# Patient Record
Sex: FEMALE | Race: WHITE | Employment: OTHER | URBAN - NONMETROPOLITAN AREA
[De-identification: names, ages, dates, MRNs, and addresses within clinical notes are randomized per-mention and may not be internally consistent; named-entity substitution may affect disease eponyms.]

---

## 2017-09-27 ENCOUNTER — OFFICE VISIT (OUTPATIENT)
Dept: ENDOCRINOLOGY | Age: 62
End: 2017-09-27
Payer: MEDICARE

## 2017-09-27 VITALS
HEART RATE: 69 BPM | BODY MASS INDEX: 50.02 KG/M2 | DIASTOLIC BLOOD PRESSURE: 72 MMHG | HEIGHT: 64 IN | WEIGHT: 293 LBS | SYSTOLIC BLOOD PRESSURE: 136 MMHG | RESPIRATION RATE: 24 BRPM

## 2017-09-27 DIAGNOSIS — E11.9 TYPE 2 DIABETES MELLITUS WITHOUT COMPLICATION, WITHOUT LONG-TERM CURRENT USE OF INSULIN (HCC): ICD-10-CM

## 2017-09-27 DIAGNOSIS — E03.9 ACQUIRED HYPOTHYROIDISM: Primary | ICD-10-CM

## 2017-09-27 DIAGNOSIS — I10 ESSENTIAL HYPERTENSION: ICD-10-CM

## 2017-09-27 PROCEDURE — 3046F HEMOGLOBIN A1C LEVEL >9.0%: CPT | Performed by: INTERNAL MEDICINE

## 2017-09-27 PROCEDURE — 3017F COLORECTAL CA SCREEN DOC REV: CPT | Performed by: INTERNAL MEDICINE

## 2017-09-27 PROCEDURE — 99204 OFFICE O/P NEW MOD 45 MIN: CPT | Performed by: INTERNAL MEDICINE

## 2017-09-27 PROCEDURE — G8427 DOCREV CUR MEDS BY ELIG CLIN: HCPCS | Performed by: INTERNAL MEDICINE

## 2017-09-27 PROCEDURE — G8417 CALC BMI ABV UP PARAM F/U: HCPCS | Performed by: INTERNAL MEDICINE

## 2017-09-27 PROCEDURE — 1036F TOBACCO NON-USER: CPT | Performed by: INTERNAL MEDICINE

## 2017-09-27 PROCEDURE — 3014F SCREEN MAMMO DOC REV: CPT | Performed by: INTERNAL MEDICINE

## 2017-09-27 RX ORDER — ACETAMINOPHEN 325 MG/1
650 TABLET ORAL EVERY 8 HOURS PRN
Status: ON HOLD | COMMUNITY
End: 2019-06-12 | Stop reason: ALTCHOICE

## 2017-09-27 RX ORDER — CLOZAPINE 100 MG/1
100 TABLET ORAL NIGHTLY
Status: ON HOLD | COMMUNITY
End: 2018-04-17 | Stop reason: CLARIF

## 2017-09-27 RX ORDER — SALSALATE 750 MG
750 TABLET ORAL 2 TIMES DAILY
Status: ON HOLD | COMMUNITY
End: 2018-04-21 | Stop reason: HOSPADM

## 2017-09-27 RX ORDER — PANTOPRAZOLE SODIUM 40 MG/1
40 TABLET, DELAYED RELEASE ORAL DAILY
COMMUNITY

## 2017-09-27 RX ORDER — LEVOTHYROXINE SODIUM 0.05 MG/1
50 TABLET ORAL DAILY
COMMUNITY
End: 2017-12-07 | Stop reason: SDUPTHER

## 2017-09-29 LAB
AVERAGE GLUCOSE: NORMAL
CHOLESTEROL, TOTAL: 166 MG/DL
CHOLESTEROL/HDL RATIO: 4.9
CREATININE, URINE: 214
HBA1C MFR BLD: 6.3 %
HDLC SERPL-MCNC: 34 MG/DL (ref 35–70)
LDL CHOLESTEROL CALCULATED: 93 MG/DL (ref 0–160)
MICROALBUMIN/CREAT 24H UR: 17.5 MG/G{CREAT}
MICROALBUMIN/CREAT UR-RTO: 8
T4 FREE: 0.95
TRIGL SERPL-MCNC: 197 MG/DL
TSH SERPL DL<=0.05 MIU/L-ACNC: 2.3 UIU/ML
VLDLC SERPL CALC-MCNC: 39 MG/DL

## 2017-11-07 ENCOUNTER — HOSPITAL ENCOUNTER (OUTPATIENT)
Dept: PHARMACY | Age: 62
Setting detail: THERAPIES SERIES
Discharge: HOME | End: 2017-11-07
Payer: MEDICARE

## 2017-11-07 VITALS
HEIGHT: 64 IN | BODY MASS INDEX: 50.02 KG/M2 | WEIGHT: 293 LBS | SYSTOLIC BLOOD PRESSURE: 102 MMHG | DIASTOLIC BLOOD PRESSURE: 64 MMHG

## 2017-11-07 PROCEDURE — G0108 DIAB MANAGE TRN  PER INDIV: HCPCS | Performed by: REGISTERED NURSE

## 2017-11-07 RX ORDER — METRONIDAZOLE 10 MG/G
1 GEL TOPICAL PRN
Status: ON HOLD | COMMUNITY
Start: 2017-04-13 | End: 2018-04-21 | Stop reason: HOSPADM

## 2017-12-07 ENCOUNTER — OFFICE VISIT (OUTPATIENT)
Dept: ENDOCRINOLOGY | Age: 62
End: 2017-12-07
Payer: MEDICARE

## 2017-12-07 VITALS
WEIGHT: 293 LBS | SYSTOLIC BLOOD PRESSURE: 120 MMHG | DIASTOLIC BLOOD PRESSURE: 78 MMHG | HEART RATE: 52 BPM | HEIGHT: 64 IN | RESPIRATION RATE: 24 BRPM | BODY MASS INDEX: 50.02 KG/M2

## 2017-12-07 DIAGNOSIS — E11.9 TYPE 2 DIABETES MELLITUS WITHOUT COMPLICATION, WITHOUT LONG-TERM CURRENT USE OF INSULIN (HCC): ICD-10-CM

## 2017-12-07 DIAGNOSIS — I10 ESSENTIAL HYPERTENSION: ICD-10-CM

## 2017-12-07 DIAGNOSIS — E03.9 ACQUIRED HYPOTHYROIDISM: Primary | ICD-10-CM

## 2017-12-07 PROCEDURE — 1036F TOBACCO NON-USER: CPT | Performed by: INTERNAL MEDICINE

## 2017-12-07 PROCEDURE — 99214 OFFICE O/P EST MOD 30 MIN: CPT | Performed by: INTERNAL MEDICINE

## 2017-12-07 PROCEDURE — G8484 FLU IMMUNIZE NO ADMIN: HCPCS | Performed by: INTERNAL MEDICINE

## 2017-12-07 PROCEDURE — 3014F SCREEN MAMMO DOC REV: CPT | Performed by: INTERNAL MEDICINE

## 2017-12-07 PROCEDURE — G8417 CALC BMI ABV UP PARAM F/U: HCPCS | Performed by: INTERNAL MEDICINE

## 2017-12-07 PROCEDURE — 3017F COLORECTAL CA SCREEN DOC REV: CPT | Performed by: INTERNAL MEDICINE

## 2017-12-07 PROCEDURE — 3044F HG A1C LEVEL LT 7.0%: CPT | Performed by: INTERNAL MEDICINE

## 2017-12-07 PROCEDURE — G8427 DOCREV CUR MEDS BY ELIG CLIN: HCPCS | Performed by: INTERNAL MEDICINE

## 2017-12-07 RX ORDER — LEVOTHYROXINE SODIUM 0.05 MG/1
50 TABLET ORAL DAILY
Qty: 30 TABLET | Refills: 5 | Status: SHIPPED | OUTPATIENT
Start: 2017-12-07

## 2017-12-07 NOTE — PROGRESS NOTES
[facility] PROFESSIONAL SERVS  ENDOCRINE DIABETES METABOLISM [facility]  [address]  [address]  Dept: [phone]  Dept Fax: [phone] : [phone]    Visit Date: 12/7/2017    Murphy Couch is a 57 y.o. female who presents today for:  Chief Complaint   Patient presents with    Hypothyroidism    Diabetes     Type 2    Foot Problem     some tingling    Eye Problem     9/17    Hypertension    Results     9/29/17- labs completed            Subjective:   57-year-old female being followed for hypothyroidism, type 2 diabetes mellitus and hypertension. Patient has had hypothyroidism for 15 years. She takes 50 µg of Synthroid and her labs were normal in September. Today she is complaining of a little bit of fatigue and mild depression with no constipation or cold intolerance. Patient reports complete compliance with medication. The patient takes metformin 500 mg twice a day for diabetes mellitus. She tells me that she checks blood sugar one to 2 times a day but did not bring her records. The patient denies polyuria, polydipsia and visual blurriness. She reports no open sores in the feet. For blood pressure she takes 180 mg of verapamil daily. Patient denies chest pain and shortness of breath. There is no headache or visual problems. Past Medical History:   Diagnosis Date    Fibromyalgia     Hyperlipidemia     Hypertension     Hypothyroidism     Restless leg     Type 2 diabetes mellitus without complication ([address]       History reviewed. No pertinent surgical history.     Family History   Problem Relation Age of Onset    Diabetes Mother     Heart Disease Mother     Diabetes Father      Social History   Substance Use Topics    Smoking status: Never Smoker    Smokeless tobacco: Never Used    Alcohol use No      Current Outpatient Prescriptions   Medication Sig Dispense Refill    levothyroxine (SYNTHROID) 50 MCG tablet Take 1 tablet by mouth Daily 30 tablet 5    VLDL 39 09/29/2017     Lab Results   Component Value Date    RUSTY 4.9 09/29/2017         Review of Systems  Constitutional: negative for chills and fevers  Eyes: negative for irritation, redness and visual disturbance  Respiratory: negative for cough, shortness of breath and wheezing  Cardiovascular: negative for chest pain, irregular heart beat and palpitations    The remainder of systems were reviewed and negative. Objective:   /78   Pulse 52   Resp 24   Ht 5' 4.02\" (1.626 m)   Wt (!) 368 lb 8 oz (167.2 kg)   BMI 63.22 kg/m²     General:  alert, appears stated age, cooperative and no distress   Oropharynx: normal findings: lips normal without lesions, buccal mucosa normal, gums healthy and tongue midline and normal    Eyes:  negative findings: lids and lashes normal, conjunctivae and sclerae normal, corneas clear and pupils equal, round, reactive to light and accomodation       Neck: no adenopathy, no carotid bruit, no JVD, supple, symmetrical, trachea midline and thyroid not enlarged, symmetric, no tenderness/mass/nodules   Thyroid:  no palpable nodule   Lung: clear to auscultation bilaterally   Heart:  regular rate and rhythm, S1, S2 normal, no murmur, click, rub or gallop and normal apical impulse   Abdomen: soft, non-tender; bowel sounds normal; no masses,  no organomegaly   Extremities: extremities normal, atraumatic, no cyanosis or edema and Homans sign is negative, no sign of DVT   Pulses: 2+ and symmetric   Skin: warm and dry, no hyperpigmentation, vitiligo, or suspicious lesions   Neuro: normal without focal findings, mental status, speech normal, alert and oriented x3, KRIS, cranial nerves 2-12 intact, muscle tone and strength normal and symmetric. LNS: no cervical/supraclavicular or submental adenopathy  Psych: awake, alert and oriented, with good eye contact and normal behavior. Affect is normal with no anxious or depressed mood.  Insight is appropriate with no

## 2017-12-07 NOTE — LETTER
 Smoking status: Never Smoker    Smokeless tobacco: Never Used    Alcohol use No      Current Outpatient Prescriptions   Medication Sig Dispense Refill    levothyroxine (SYNTHROID) 50 MCG tablet Take 1 tablet by mouth Daily 30 tablet 5    metFORMIN (GLUCOPHAGE) 500 MG tablet Take 1 tablet by mouth 2 times daily (with meals) 60 tablet 5    metroNIDAZOLE (METROGEL) 1 % gel Apply 1 applicator topically as needed      acetaminophen (TYLENOL) 325 MG tablet Take 650 mg by mouth every 8 hours      verapamil (CALAN SR) 180 MG extended release tablet Take 180 mg by mouth nightly      cloZAPine (CLOZARIL) 100 MG tablet Take 100 mg by mouth nightly      pantoprazole (PROTONIX) 40 MG tablet Take 40 mg by mouth daily      aspirin 81 MG tablet Take 81 mg by mouth daily      fluticasone-salmeterol (ADVAIR) 250-50 MCG/DOSE AEPB Inhale 1 puff into the lungs every 12 hours      salsalate (DISALCID) 750 MG tablet Take 750 mg by mouth 2 times daily      sertraline (ZOLOFT) 50 MG tablet Take 50 mg by mouth daily       No current facility-administered medications for this visit.       Allergies   Allergen Reactions    Lisinopril Anaphylaxis     Health Maintenance   Topic Date Due    Hepatitis C screen  1955    Diabetic foot exam  05/11/1963    Diabetic retinal exam  05/11/1963    HIV screen  05/11/1968    DTaP/Tdap/Td vaccine (1 - Tdap) 05/11/1972    Pneumococcal med risk (1 of 1 - PPSV23) 05/11/1972    Cervical cancer screen  05/11/1974    Breast cancer screen  05/11/2003    Colon cancer screen colonoscopy  05/11/2003    Zostavax vaccine  05/11/2013    Flu vaccine (1) 09/01/2017    Diabetic hemoglobin A1C test  09/29/2018    Diabetic microalbuminuria test  09/29/2018    Lipid screen  09/29/2018       Labs  Lab Results   Component Value Date    LABA1C 6.3 09/29/2017     No results found for: EAG  Lab Results   Component Value Date    TSH 2.30 09/29/2017     Lab Results   Component Value Date CHOL 166 09/29/2017     Lab Results   Component Value Date    TRIG 197 09/29/2017     Lab Results   Component Value Date    HDL 34 (A) 09/29/2017     Lab Results   Component Value Date    LDLCALC 93 09/29/2017     Lab Results   Component Value Date    VLDL 39 09/29/2017     Lab Results   Component Value Date    CHOLHDLRATIO 4.9 09/29/2017         Review of Systems  Constitutional: negative for chills and fevers  Eyes: negative for irritation, redness and visual disturbance  Respiratory: negative for cough, shortness of breath and wheezing  Cardiovascular: negative for chest pain, irregular heart beat and palpitations    The remainder of systems were reviewed and negative.      Objective:   /78   Pulse 52   Resp 24   Ht 5' 4.02\" (1.626 m)   Wt (!) 368 lb 8 oz (167.2 kg)   BMI 63.22 kg/m²      General:  alert, appears stated age, cooperative and no distress   Oropharynx: normal findings: lips normal without lesions, buccal mucosa normal, gums healthy and tongue midline and normal    Eyes:  negative findings: lids and lashes normal, conjunctivae and sclerae normal, corneas clear and pupils equal, round, reactive to light and accomodation       Neck: no adenopathy, no carotid bruit, no JVD, supple, symmetrical, trachea midline and thyroid not enlarged, symmetric, no tenderness/mass/nodules   Thyroid:  no palpable nodule   Lung: clear to auscultation bilaterally   Heart:  regular rate and rhythm, S1, S2 normal, no murmur, click, rub or gallop and normal apical impulse   Abdomen: soft, non-tender; bowel sounds normal; no masses,  no organomegaly   Extremities: extremities normal, atraumatic, no cyanosis or edema and Homans sign is negative, no sign of DVT   Pulses: 2+ and symmetric   Skin: warm and dry, no hyperpigmentation, vitiligo, or suspicious lesions   Neuro: normal without focal findings, mental status, speech normal, alert and oriented x3, KRIS, cranial nerves 2-12 intact, muscle tone and strength normal and symmetric. LNS: no cervical/supraclavicular or submental adenopathy  Psych: awake, alert and oriented, with good eye contact and normal behavior. Affect is normal with no anxious or depressed mood. Insight is appropriate with no hallucinations/delusions or SI  M/S: no muscular hypertrophy or tenderness. There is no joint swelling/tenderness/redness  Assessment/Plan:     1. Acquired hypothyroidism: on treatment with reasonable control. To continue. \    2. Type 2 diabetes mellitus without complication, without long-term current use of insulin ([address]): Clinically doing well. I did not review her blood sugar readings that her A1c is at goal.  Committee challenges weight to be weight control. Therefore we discussed diet and exercise towards this goal.     3. Essential hypertension: on treatment with reasonable control. To continue. 4. Class 3 obesity due to excess calories with serious comorbidity and body mass index (BMI) of 60.0 to 69.9 in [facility]) : Diet and exercise were discussed. Orders Placed This Encounter   Procedures    TSH without Reflex     Standing Status:   Future     Standing Expiration Date:   12/7/2018    T4, Free     Standing Status:   Future     Standing Expiration Date:   12/7/2018       · The risks and benefits of my recommendations, as well as other treatment options were discussed with the patient today. Questions were answered. · Follow up: 3 months and as needed. If you have questions, please do not hesitate to call me. I look forward to following Victor Manuel along with you.     Sincerely,        Re Corbin MD

## 2018-04-16 ENCOUNTER — HOSPITAL ENCOUNTER (INPATIENT)
Age: 63
LOS: 5 days | Discharge: HOME HEALTH | DRG: 193 | End: 2018-04-21
Attending: INTERNAL MEDICINE | Admitting: INTERNAL MEDICINE
Payer: MEDICARE

## 2018-04-16 DIAGNOSIS — E66.2 OBESITY HYPOVENTILATION SYNDROME (HCC): ICD-10-CM

## 2018-04-16 DIAGNOSIS — G47.33 OBSTRUCTIVE SLEEP APNEA SYNDROME: ICD-10-CM

## 2018-04-16 DIAGNOSIS — J96.02 ACUTE RESPIRATORY FAILURE WITH HYPOXIA AND HYPERCAPNIA (HCC): ICD-10-CM

## 2018-04-16 DIAGNOSIS — J96.11 CHRONIC RESPIRATORY FAILURE WITH HYPOXIA AND HYPERCAPNIA (HCC): ICD-10-CM

## 2018-04-16 DIAGNOSIS — J96.12 CHRONIC RESPIRATORY FAILURE WITH HYPOXIA AND HYPERCAPNIA (HCC): ICD-10-CM

## 2018-04-16 DIAGNOSIS — J96.01 ACUTE RESPIRATORY FAILURE WITH HYPOXIA AND HYPERCAPNIA (HCC): ICD-10-CM

## 2018-04-16 DIAGNOSIS — R00.1 BRADYCARDIA: Primary | ICD-10-CM

## 2018-04-16 LAB — MRSA SCREEN RT-PCR: NEGATIVE

## 2018-04-16 PROCEDURE — 87641 MR-STAPH DNA AMP PROBE: CPT

## 2018-04-16 PROCEDURE — 87081 CULTURE SCREEN ONLY: CPT

## 2018-04-16 PROCEDURE — 2060000000 HC ICU INTERMEDIATE R&B

## 2018-04-17 ENCOUNTER — APPOINTMENT (OUTPATIENT)
Dept: GENERAL RADIOLOGY | Age: 63
DRG: 193 | End: 2018-04-17
Attending: INTERNAL MEDICINE
Payer: MEDICARE

## 2018-04-17 PROBLEM — G47.30 SLEEP APNEA: Status: ACTIVE | Noted: 2018-04-17

## 2018-04-17 PROBLEM — K21.9 GERD (GASTROESOPHAGEAL REFLUX DISEASE): Status: ACTIVE | Noted: 2018-04-17

## 2018-04-17 PROBLEM — R00.1 BRADYCARDIA: Status: ACTIVE | Noted: 2018-04-17

## 2018-04-17 PROBLEM — J45.909 ASTHMA: Status: ACTIVE | Noted: 2018-04-17

## 2018-04-17 PROBLEM — J18.9 PNEUMONIA DUE TO INFECTIOUS ORGANISM: Status: ACTIVE | Noted: 2018-04-17

## 2018-04-17 PROBLEM — E66.01 MORBID OBESITY (HCC): Status: ACTIVE | Noted: 2018-04-17

## 2018-04-17 PROBLEM — N18.30 STAGE 3 CHRONIC KIDNEY DISEASE (HCC): Status: ACTIVE | Noted: 2018-04-17

## 2018-04-17 PROBLEM — J96.01 ACUTE RESPIRATORY FAILURE WITH HYPOXIA AND HYPERCAPNIA (HCC): Status: ACTIVE | Noted: 2018-04-16

## 2018-04-17 LAB
ALLEN TEST: POSITIVE
ANION GAP SERPL CALCULATED.3IONS-SCNC: 9 MEQ/L (ref 8–16)
ANISOCYTOSIS: ABNORMAL
AVERAGE GLUCOSE: 135 MG/DL (ref 70–126)
BASE EXCESS (CALCULATED): 4.2 MMOL/L (ref -2.5–2.5)
BASE EXCESS (CALCULATED): 4.5 MMOL/L (ref -2.5–2.5)
BASE EXCESS (CALCULATED): 4.9 MMOL/L (ref -2.5–2.5)
BASE EXCESS (CALCULATED): 6.2 MMOL/L (ref -2.5–2.5)
BASOPHILS # BLD: 0.4 %
BASOPHILS ABSOLUTE: 0.1 THOU/MM3 (ref 0–0.1)
BUN BLDV-MCNC: 41 MG/DL (ref 7–22)
CALCIUM SERPL-MCNC: 9.6 MG/DL (ref 8.5–10.5)
CHLORIDE BLD-SCNC: 103 MEQ/L (ref 98–111)
CO2: 30 MEQ/L (ref 23–33)
COLLECTED BY:: ABNORMAL
CREAT SERPL-MCNC: 0.9 MG/DL (ref 0.4–1.2)
D-DIMER QUANTITATIVE: 967 NG/ML FEU (ref 0–500)
DEVICE: ABNORMAL
EKG ATRIAL RATE: 48 BPM
EKG P AXIS: 8 DEGREES
EKG P-R INTERVAL: 178 MS
EKG Q-T INTERVAL: 490 MS
EKG QRS DURATION: 96 MS
EKG QTC CALCULATION (BAZETT): 437 MS
EKG R AXIS: -7 DEGREES
EKG T AXIS: 50 DEGREES
EKG VENTRICULAR RATE: 48 BPM
EOSINOPHIL # BLD: 0.6 %
EOSINOPHILS ABSOLUTE: 0.1 THOU/MM3 (ref 0–0.4)
GFR SERPL CREATININE-BSD FRML MDRD: 63 ML/MIN/1.73M2
GLUCOSE BLD-MCNC: 175 MG/DL (ref 70–108)
GLUCOSE BLD-MCNC: 178 MG/DL (ref 70–108)
GLUCOSE BLD-MCNC: 185 MG/DL (ref 70–108)
GLUCOSE BLD-MCNC: 192 MG/DL (ref 70–108)
GLUCOSE BLD-MCNC: 219 MG/DL (ref 70–108)
GLUCOSE BLD-MCNC: 255 MG/DL (ref 70–108)
HBA1C MFR BLD: 6.5 % (ref 4.4–6.4)
HCO3: 32 MMOL/L (ref 23–28)
HCO3: 34 MMOL/L (ref 23–28)
HCO3: 35 MMOL/L (ref 23–28)
HCO3: 36 MMOL/L (ref 23–28)
HCT VFR BLD CALC: 40 % (ref 37–47)
HEMOGLOBIN: 13.1 GM/DL (ref 12–16)
IFIO2: 2
IFIO2: 40
INR BLD: 1.11 (ref 0.85–1.13)
LACTIC ACID: 1.8 MMOL/L (ref 0.5–2.2)
LIPASE: 10.4 U/L (ref 5.6–51.3)
LV EF: 53 %
LVEF MODALITY: NORMAL
LYMPHOCYTES # BLD: 7.5 %
LYMPHOCYTES ABSOLUTE: 1 THOU/MM3 (ref 1–4.8)
MCH RBC QN AUTO: 31.1 PG (ref 27–31)
MCHC RBC AUTO-ENTMCNC: 32.7 GM/DL (ref 33–37)
MCV RBC AUTO: 95 FL (ref 81–99)
MODE: ABNORMAL
MODE: ABNORMAL
MONOCYTES # BLD: 5.8 %
MONOCYTES ABSOLUTE: 0.8 THOU/MM3 (ref 0.4–1.3)
NUCLEATED RED BLOOD CELLS: 0 /100 WBC
O2 SATURATION: 87 %
O2 SATURATION: 95 %
O2 SATURATION: 95 %
O2 SATURATION: 96 %
OVALOCYTES: ABNORMAL
PCO2: 61 MMHG (ref 35–45)
PCO2: 72 MMHG (ref 35–45)
PCO2: 72 MMHG (ref 35–45)
PCO2: 87 MMHG (ref 35–45)
PDW BLD-RTO: 15.2 % (ref 11.5–14.5)
PH BLOOD GAS: 7.22 (ref 7.35–7.45)
PH BLOOD GAS: 7.28 (ref 7.35–7.45)
PH BLOOD GAS: 7.3 (ref 7.35–7.45)
PH BLOOD GAS: 7.33 (ref 7.35–7.45)
PLATELET # BLD: 206 THOU/MM3 (ref 130–400)
PLATELET ESTIMATE: ADEQUATE
PMV BLD AUTO: 9.6 FL (ref 7.4–10.4)
PO2: 58 MMHG (ref 71–104)
PO2: 91 MMHG (ref 71–104)
PO2: 93 MMHG (ref 71–104)
PO2: 94 MMHG (ref 71–104)
POIKILOCYTES: ABNORMAL
POTASSIUM REFLEX MAGNESIUM: 5.1 MEQ/L (ref 3.5–5.2)
PRO-BNP: 2029 PG/ML (ref 0–900)
PROCALCITONIN: 0.12 NG/ML (ref 0.01–0.09)
RBC # BLD: 4.21 MILL/MM3 (ref 4.2–5.4)
SCAN OF BLOOD SMEAR: NORMAL
SEG NEUTROPHILS: 85.7 %
SEGMENTED NEUTROPHILS ABSOLUTE COUNT: 11.7 THOU/MM3 (ref 1.8–7.7)
SET PEEP: 8 MMHG
SET PRESS SUPP: 12 CMH2O
SET PRESS SUPP: 16 CMH2O
SET PRESS SUPP: 8 CMH2O
SET RESPIRATORY RATE: 16 BPM
SET RESPIRATORY RATE: 20 BPM
SODIUM BLD-SCNC: 142 MEQ/L (ref 135–145)
SOURCE, BLOOD GAS: ABNORMAL
T4 FREE: 0.88 NG/DL (ref 0.93–1.76)
TROPONIN T: < 0.01 NG/ML
TSH SERPL DL<=0.05 MIU/L-ACNC: 0.29 UIU/ML (ref 0.4–4.2)
WBC # BLD: 13.7 THOU/MM3 (ref 4.8–10.8)

## 2018-04-17 PROCEDURE — 94660 CPAP INITIATION&MGMT: CPT

## 2018-04-17 PROCEDURE — 93306 TTE W/DOPPLER COMPLETE: CPT

## 2018-04-17 PROCEDURE — 2500000003 HC RX 250 WO HCPCS: Performed by: INTERNAL MEDICINE

## 2018-04-17 PROCEDURE — 84443 ASSAY THYROID STIM HORMONE: CPT

## 2018-04-17 PROCEDURE — APPSS180 APP SPLIT SHARED TIME > 60 MINUTES: Performed by: NURSE PRACTITIONER

## 2018-04-17 PROCEDURE — 85610 PROTHROMBIN TIME: CPT

## 2018-04-17 PROCEDURE — 6370000000 HC RX 637 (ALT 250 FOR IP): Performed by: FAMILY MEDICINE

## 2018-04-17 PROCEDURE — 84484 ASSAY OF TROPONIN QUANT: CPT

## 2018-04-17 PROCEDURE — 6370000000 HC RX 637 (ALT 250 FOR IP): Performed by: INTERNAL MEDICINE

## 2018-04-17 PROCEDURE — 71045 X-RAY EXAM CHEST 1 VIEW: CPT

## 2018-04-17 PROCEDURE — 83880 ASSAY OF NATRIURETIC PEPTIDE: CPT

## 2018-04-17 PROCEDURE — 99223 1ST HOSP IP/OBS HIGH 75: CPT | Performed by: FAMILY MEDICINE

## 2018-04-17 PROCEDURE — 82803 BLOOD GASES ANY COMBINATION: CPT

## 2018-04-17 PROCEDURE — 83036 HEMOGLOBIN GLYCOSYLATED A1C: CPT

## 2018-04-17 PROCEDURE — 83605 ASSAY OF LACTIC ACID: CPT

## 2018-04-17 PROCEDURE — 2060000000 HC ICU INTERMEDIATE R&B

## 2018-04-17 PROCEDURE — 94640 AIRWAY INHALATION TREATMENT: CPT

## 2018-04-17 PROCEDURE — 99233 SBSQ HOSP IP/OBS HIGH 50: CPT | Performed by: INTERNAL MEDICINE

## 2018-04-17 PROCEDURE — 2580000003 HC RX 258: Performed by: INTERNAL MEDICINE

## 2018-04-17 PROCEDURE — 82948 REAGENT STRIP/BLOOD GLUCOSE: CPT

## 2018-04-17 PROCEDURE — 2700000000 HC OXYGEN THERAPY PER DAY

## 2018-04-17 PROCEDURE — 80048 BASIC METABOLIC PNL TOTAL CA: CPT

## 2018-04-17 PROCEDURE — 99223 1ST HOSP IP/OBS HIGH 75: CPT | Performed by: INTERNAL MEDICINE

## 2018-04-17 PROCEDURE — 2580000003 HC RX 258: Performed by: FAMILY MEDICINE

## 2018-04-17 PROCEDURE — 84439 ASSAY OF FREE THYROXINE: CPT

## 2018-04-17 PROCEDURE — 6360000002 HC RX W HCPCS: Performed by: FAMILY MEDICINE

## 2018-04-17 PROCEDURE — 85379 FIBRIN DEGRADATION QUANT: CPT

## 2018-04-17 PROCEDURE — 84145 PROCALCITONIN (PCT): CPT

## 2018-04-17 PROCEDURE — 85025 COMPLETE CBC W/AUTO DIFF WBC: CPT

## 2018-04-17 PROCEDURE — 36600 WITHDRAWAL OF ARTERIAL BLOOD: CPT

## 2018-04-17 PROCEDURE — 83690 ASSAY OF LIPASE: CPT

## 2018-04-17 PROCEDURE — 6360000002 HC RX W HCPCS: Performed by: INTERNAL MEDICINE

## 2018-04-17 PROCEDURE — 36415 COLL VENOUS BLD VENIPUNCTURE: CPT

## 2018-04-17 PROCEDURE — 93005 ELECTROCARDIOGRAM TRACING: CPT | Performed by: FAMILY MEDICINE

## 2018-04-17 RX ORDER — DEXTROSE MONOHYDRATE 50 MG/ML
100 INJECTION, SOLUTION INTRAVENOUS PRN
Status: DISCONTINUED | OUTPATIENT
Start: 2018-04-16 | End: 2018-04-21 | Stop reason: HOSPADM

## 2018-04-17 RX ORDER — NICOTINE POLACRILEX 4 MG
15 LOZENGE BUCCAL PRN
Status: DISCONTINUED | OUTPATIENT
Start: 2018-04-16 | End: 2018-04-21 | Stop reason: HOSPADM

## 2018-04-17 RX ORDER — ASPIRIN 81 MG/1
81 TABLET, CHEWABLE ORAL DAILY
Status: DISCONTINUED | OUTPATIENT
Start: 2018-04-17 | End: 2018-04-21 | Stop reason: HOSPADM

## 2018-04-17 RX ORDER — CLONIDINE HYDROCHLORIDE 0.1 MG/1
0.1 TABLET ORAL 2 TIMES DAILY
Status: DISCONTINUED | OUTPATIENT
Start: 2018-04-17 | End: 2018-04-19

## 2018-04-17 RX ORDER — LEVOTHYROXINE SODIUM 0.05 MG/1
50 TABLET ORAL DAILY
Status: DISCONTINUED | OUTPATIENT
Start: 2018-04-17 | End: 2018-04-21 | Stop reason: HOSPADM

## 2018-04-17 RX ORDER — CLONIDINE HYDROCHLORIDE 0.1 MG/1
0.1 TABLET ORAL 2 TIMES DAILY
Status: ON HOLD | COMMUNITY
End: 2019-06-18 | Stop reason: HOSPADM

## 2018-04-17 RX ORDER — CLOZAPINE 100 MG/1
100 TABLET ORAL NIGHTLY
Status: DISCONTINUED | OUTPATIENT
Start: 2018-04-17 | End: 2018-04-17 | Stop reason: CLARIF

## 2018-04-17 RX ORDER — ACETAMINOPHEN 325 MG/1
650 TABLET ORAL EVERY 4 HOURS PRN
Status: DISCONTINUED | OUTPATIENT
Start: 2018-04-16 | End: 2018-04-21 | Stop reason: HOSPADM

## 2018-04-17 RX ORDER — IPRATROPIUM BROMIDE AND ALBUTEROL SULFATE 2.5; .5 MG/3ML; MG/3ML
1 SOLUTION RESPIRATORY (INHALATION) EVERY 4 HOURS
Status: DISCONTINUED | OUTPATIENT
Start: 2018-04-17 | End: 2018-04-21 | Stop reason: HOSPADM

## 2018-04-17 RX ORDER — IPRATROPIUM BROMIDE AND ALBUTEROL SULFATE 2.5; .5 MG/3ML; MG/3ML
1 SOLUTION RESPIRATORY (INHALATION) EVERY 4 HOURS PRN
Status: DISCONTINUED | OUTPATIENT
Start: 2018-04-16 | End: 2018-04-17

## 2018-04-17 RX ORDER — SODIUM CHLORIDE 0.9 % (FLUSH) 0.9 %
10 SYRINGE (ML) INJECTION PRN
Status: DISCONTINUED | OUTPATIENT
Start: 2018-04-16 | End: 2018-04-21 | Stop reason: HOSPADM

## 2018-04-17 RX ORDER — HEPARIN SODIUM 5000 [USP'U]/ML
5000 INJECTION, SOLUTION INTRAVENOUS; SUBCUTANEOUS EVERY 8 HOURS SCHEDULED
Status: DISCONTINUED | OUTPATIENT
Start: 2018-04-17 | End: 2018-04-17

## 2018-04-17 RX ORDER — PANTOPRAZOLE SODIUM 40 MG/1
40 TABLET, DELAYED RELEASE ORAL DAILY
Status: DISCONTINUED | OUTPATIENT
Start: 2018-04-17 | End: 2018-04-21 | Stop reason: HOSPADM

## 2018-04-17 RX ORDER — LACTOBACILLUS RHAMNOSUS GG 10B CELL
1 CAPSULE ORAL
Status: DISCONTINUED | OUTPATIENT
Start: 2018-04-18 | End: 2018-04-21 | Stop reason: HOSPADM

## 2018-04-17 RX ORDER — SODIUM CHLORIDE 0.9 % (FLUSH) 0.9 %
10 SYRINGE (ML) INJECTION EVERY 12 HOURS SCHEDULED
Status: DISCONTINUED | OUTPATIENT
Start: 2018-04-17 | End: 2018-04-21 | Stop reason: HOSPADM

## 2018-04-17 RX ORDER — ONDANSETRON 2 MG/ML
4 INJECTION INTRAMUSCULAR; INTRAVENOUS EVERY 6 HOURS PRN
Status: DISCONTINUED | OUTPATIENT
Start: 2018-04-16 | End: 2018-04-21 | Stop reason: HOSPADM

## 2018-04-17 RX ORDER — SODIUM CHLORIDE 9 MG/ML
INJECTION, SOLUTION INTRAVENOUS CONTINUOUS
Status: DISCONTINUED | OUTPATIENT
Start: 2018-04-17 | End: 2018-04-18

## 2018-04-17 RX ORDER — DOXYCYCLINE HYCLATE 100 MG
100 TABLET ORAL EVERY 12 HOURS SCHEDULED
Status: CANCELLED | OUTPATIENT
Start: 2018-04-17

## 2018-04-17 RX ORDER — DEXTROSE MONOHYDRATE 25 G/50ML
12.5 INJECTION, SOLUTION INTRAVENOUS PRN
Status: DISCONTINUED | OUTPATIENT
Start: 2018-04-16 | End: 2018-04-21 | Stop reason: HOSPADM

## 2018-04-17 RX ORDER — AZITHROMYCIN 250 MG/1
500 TABLET, FILM COATED ORAL DAILY
Status: DISCONTINUED | OUTPATIENT
Start: 2018-04-17 | End: 2018-04-17 | Stop reason: SDUPTHER

## 2018-04-17 RX ORDER — METHYLPREDNISOLONE SODIUM SUCCINATE 40 MG/ML
40 INJECTION, POWDER, LYOPHILIZED, FOR SOLUTION INTRAMUSCULAR; INTRAVENOUS DAILY
Status: DISCONTINUED | OUTPATIENT
Start: 2018-04-17 | End: 2018-04-19

## 2018-04-17 RX ADMIN — Medication 10 ML: at 20:27

## 2018-04-17 RX ADMIN — ENOXAPARIN SODIUM 60 MG: 60 INJECTION SUBCUTANEOUS at 16:10

## 2018-04-17 RX ADMIN — Medication 10 ML: at 09:28

## 2018-04-17 RX ADMIN — HEPARIN SODIUM 5000 UNITS: 5000 INJECTION, SOLUTION INTRAVENOUS; SUBCUTANEOUS at 06:14

## 2018-04-17 RX ADMIN — IPRATROPIUM BROMIDE AND ALBUTEROL SULFATE 1 AMPULE: .5; 3 SOLUTION RESPIRATORY (INHALATION) at 15:45

## 2018-04-17 RX ADMIN — SODIUM CHLORIDE: 9 INJECTION, SOLUTION INTRAVENOUS at 12:36

## 2018-04-17 RX ADMIN — LEVOTHYROXINE SODIUM 50 MCG: 50 TABLET ORAL at 06:14

## 2018-04-17 RX ADMIN — PIPERACILLIN SODIUM AND TAZOBACTAM SODIUM 3.38 G: 3; .375 INJECTION, POWDER, LYOPHILIZED, FOR SOLUTION INTRAVENOUS at 17:24

## 2018-04-17 RX ADMIN — INSULIN LISPRO 6 UNITS: 100 INJECTION, SOLUTION INTRAVENOUS; SUBCUTANEOUS at 17:24

## 2018-04-17 RX ADMIN — INSULIN LISPRO 2 UNITS: 100 INJECTION, SOLUTION INTRAVENOUS; SUBCUTANEOUS at 09:34

## 2018-04-17 RX ADMIN — DOXYCYCLINE 100 MG: 100 INJECTION, POWDER, LYOPHILIZED, FOR SOLUTION INTRAVENOUS at 16:10

## 2018-04-17 RX ADMIN — PIPERACILLIN SODIUM AND TAZOBACTAM SODIUM 3.38 G: 3; .375 INJECTION, POWDER, LYOPHILIZED, FOR SOLUTION INTRAVENOUS at 09:34

## 2018-04-17 RX ADMIN — Medication 1 UNITS: at 20:32

## 2018-04-17 RX ADMIN — METHYLPREDNISOLONE SODIUM SUCCINATE 40 MG: 40 INJECTION, POWDER, FOR SOLUTION INTRAMUSCULAR; INTRAVENOUS at 09:27

## 2018-04-17 RX ADMIN — PIPERACILLIN SODIUM AND TAZOBACTAM SODIUM 3.38 G: 3; .375 INJECTION, POWDER, LYOPHILIZED, FOR SOLUTION INTRAVENOUS at 01:46

## 2018-04-17 RX ADMIN — IPRATROPIUM BROMIDE AND ALBUTEROL SULFATE 1 AMPULE: .5; 3 SOLUTION RESPIRATORY (INHALATION) at 09:43

## 2018-04-17 RX ADMIN — IPRATROPIUM BROMIDE AND ALBUTEROL SULFATE 1 AMPULE: .5; 3 SOLUTION RESPIRATORY (INHALATION) at 19:30

## 2018-04-17 RX ADMIN — INSULIN LISPRO 2 UNITS: 100 INJECTION, SOLUTION INTRAVENOUS; SUBCUTANEOUS at 14:01

## 2018-04-17 RX ADMIN — Medication 2 UNITS: at 01:46

## 2018-04-17 RX ADMIN — PANTOPRAZOLE SODIUM 40 MG: 40 TABLET, DELAYED RELEASE ORAL at 06:14

## 2018-04-17 RX ADMIN — IPRATROPIUM BROMIDE AND ALBUTEROL SULFATE 1 AMPULE: .5; 3 SOLUTION RESPIRATORY (INHALATION) at 00:33

## 2018-04-17 RX ADMIN — CLONIDINE HYDROCHLORIDE 0.1 MG: 0.1 TABLET ORAL at 20:26

## 2018-04-17 ASSESSMENT — PAIN SCALES - GENERAL
PAINLEVEL_OUTOF10: 0

## 2018-04-18 ENCOUNTER — APPOINTMENT (OUTPATIENT)
Dept: CT IMAGING | Age: 63
DRG: 193 | End: 2018-04-18
Attending: INTERNAL MEDICINE
Payer: MEDICARE

## 2018-04-18 LAB
ALLEN TEST: POSITIVE
BASE EXCESS (CALCULATED): 5.7 MMOL/L (ref -2.5–2.5)
COLLECTED BY:: ABNORMAL
DEVICE: ABNORMAL
GLUCOSE BLD-MCNC: 126 MG/DL (ref 70–108)
GLUCOSE BLD-MCNC: 196 MG/DL (ref 70–108)
GLUCOSE BLD-MCNC: 197 MG/DL (ref 70–108)
GLUCOSE BLD-MCNC: 200 MG/DL (ref 70–108)
HCO3: 34 MMOL/L (ref 23–28)
IFIO2: 30
INR BLD: 1.11 (ref 0.85–1.13)
MODE: ABNORMAL
MRSA SCREEN: NORMAL
O2 SATURATION: 94 %
PCO2: 69 MMHG (ref 35–45)
PH BLOOD GAS: 7.31 (ref 7.35–7.45)
PO2: 82 MMHG (ref 71–104)
SET PEEP: 8 MMHG
SET PRESS SUPP: 16 CMH2O
SET RESPIRATORY RATE: 20 BPM
SOURCE, BLOOD GAS: ABNORMAL
VRE CULTURE: NORMAL

## 2018-04-18 PROCEDURE — 71250 CT THORAX DX C-: CPT

## 2018-04-18 PROCEDURE — 82803 BLOOD GASES ANY COMBINATION: CPT

## 2018-04-18 PROCEDURE — 99232 SBSQ HOSP IP/OBS MODERATE 35: CPT | Performed by: INTERNAL MEDICINE

## 2018-04-18 PROCEDURE — 2060000000 HC ICU INTERMEDIATE R&B

## 2018-04-18 PROCEDURE — 2580000003 HC RX 258: Performed by: FAMILY MEDICINE

## 2018-04-18 PROCEDURE — 99232 SBSQ HOSP IP/OBS MODERATE 35: CPT | Performed by: NURSE PRACTITIONER

## 2018-04-18 PROCEDURE — 85610 PROTHROMBIN TIME: CPT

## 2018-04-18 PROCEDURE — 2700000000 HC OXYGEN THERAPY PER DAY

## 2018-04-18 PROCEDURE — 6370000000 HC RX 637 (ALT 250 FOR IP): Performed by: FAMILY MEDICINE

## 2018-04-18 PROCEDURE — 36600 WITHDRAWAL OF ARTERIAL BLOOD: CPT

## 2018-04-18 PROCEDURE — 94640 AIRWAY INHALATION TREATMENT: CPT

## 2018-04-18 PROCEDURE — 2580000003 HC RX 258: Performed by: INTERNAL MEDICINE

## 2018-04-18 PROCEDURE — 6370000000 HC RX 637 (ALT 250 FOR IP): Performed by: INTERNAL MEDICINE

## 2018-04-18 PROCEDURE — 94660 CPAP INITIATION&MGMT: CPT

## 2018-04-18 PROCEDURE — 2500000003 HC RX 250 WO HCPCS: Performed by: INTERNAL MEDICINE

## 2018-04-18 PROCEDURE — 36415 COLL VENOUS BLD VENIPUNCTURE: CPT

## 2018-04-18 PROCEDURE — 6370000000 HC RX 637 (ALT 250 FOR IP): Performed by: NURSE PRACTITIONER

## 2018-04-18 PROCEDURE — 82948 REAGENT STRIP/BLOOD GLUCOSE: CPT

## 2018-04-18 PROCEDURE — 6360000002 HC RX W HCPCS: Performed by: INTERNAL MEDICINE

## 2018-04-18 PROCEDURE — 6360000002 HC RX W HCPCS: Performed by: FAMILY MEDICINE

## 2018-04-18 RX ORDER — ACYCLOVIR 50 MG/G
OINTMENT TOPICAL
Status: DISCONTINUED | OUTPATIENT
Start: 2018-04-18 | End: 2018-04-18 | Stop reason: CLARIF

## 2018-04-18 RX ORDER — AMLODIPINE BESYLATE 10 MG/1
10 TABLET ORAL DAILY
Status: DISCONTINUED | OUTPATIENT
Start: 2018-04-18 | End: 2018-04-21 | Stop reason: HOSPADM

## 2018-04-18 RX ORDER — HYDROCHLOROTHIAZIDE 25 MG/1
25 TABLET ORAL DAILY
Status: DISCONTINUED | OUTPATIENT
Start: 2018-04-18 | End: 2018-04-19

## 2018-04-18 RX ORDER — ACYCLOVIR 50 MG/G
OINTMENT TOPICAL 3 TIMES DAILY
Status: DISCONTINUED | OUTPATIENT
Start: 2018-04-18 | End: 2018-04-21 | Stop reason: HOSPADM

## 2018-04-18 RX ADMIN — Medication 10 ML: at 09:21

## 2018-04-18 RX ADMIN — PANTOPRAZOLE SODIUM 40 MG: 40 TABLET, DELAYED RELEASE ORAL at 06:27

## 2018-04-18 RX ADMIN — ASPIRIN 81 MG 81 MG: 81 TABLET ORAL at 09:24

## 2018-04-18 RX ADMIN — PIPERACILLIN SODIUM AND TAZOBACTAM SODIUM 3.38 G: 3; .375 INJECTION, POWDER, LYOPHILIZED, FOR SOLUTION INTRAVENOUS at 03:34

## 2018-04-18 RX ADMIN — DOXYCYCLINE 100 MG: 100 INJECTION, POWDER, LYOPHILIZED, FOR SOLUTION INTRAVENOUS at 01:45

## 2018-04-18 RX ADMIN — LEVOTHYROXINE SODIUM 50 MCG: 50 TABLET ORAL at 06:27

## 2018-04-18 RX ADMIN — PIPERACILLIN SODIUM AND TAZOBACTAM SODIUM 3.38 G: 3; .375 INJECTION, POWDER, LYOPHILIZED, FOR SOLUTION INTRAVENOUS at 12:13

## 2018-04-18 RX ADMIN — CLONIDINE HYDROCHLORIDE 0.1 MG: 0.1 TABLET ORAL at 09:22

## 2018-04-18 RX ADMIN — Medication 1 CAPSULE: at 09:22

## 2018-04-18 RX ADMIN — IPRATROPIUM BROMIDE AND ALBUTEROL SULFATE 1 AMPULE: .5; 3 SOLUTION RESPIRATORY (INHALATION) at 04:25

## 2018-04-18 RX ADMIN — IPRATROPIUM BROMIDE AND ALBUTEROL SULFATE 1 AMPULE: .5; 3 SOLUTION RESPIRATORY (INHALATION) at 21:16

## 2018-04-18 RX ADMIN — ENOXAPARIN SODIUM 60 MG: 60 INJECTION SUBCUTANEOUS at 13:59

## 2018-04-18 RX ADMIN — ACYCLOVIR: 50 OINTMENT TOPICAL at 14:39

## 2018-04-18 RX ADMIN — Medication 2 UNITS: at 20:42

## 2018-04-18 RX ADMIN — AMLODIPINE BESYLATE 10 MG: 10 TABLET ORAL at 16:57

## 2018-04-18 RX ADMIN — ACYCLOVIR: 50 OINTMENT TOPICAL at 20:41

## 2018-04-18 RX ADMIN — PIPERACILLIN SODIUM AND TAZOBACTAM SODIUM 3.38 G: 3; .375 INJECTION, POWDER, LYOPHILIZED, FOR SOLUTION INTRAVENOUS at 20:41

## 2018-04-18 RX ADMIN — IPRATROPIUM BROMIDE AND ALBUTEROL SULFATE 1 AMPULE: .5; 3 SOLUTION RESPIRATORY (INHALATION) at 09:30

## 2018-04-18 RX ADMIN — INSULIN LISPRO 2 UNITS: 100 INJECTION, SOLUTION INTRAVENOUS; SUBCUTANEOUS at 12:14

## 2018-04-18 RX ADMIN — CLONIDINE HYDROCHLORIDE 0.1 MG: 0.1 TABLET ORAL at 20:41

## 2018-04-18 RX ADMIN — HYDROCHLOROTHIAZIDE 25 MG: 25 TABLET ORAL at 16:57

## 2018-04-18 RX ADMIN — SERTRALINE 50 MG: 50 TABLET, FILM COATED ORAL at 09:22

## 2018-04-18 RX ADMIN — DOXYCYCLINE 100 MG: 100 INJECTION, POWDER, LYOPHILIZED, FOR SOLUTION INTRAVENOUS at 16:59

## 2018-04-18 RX ADMIN — ENOXAPARIN SODIUM 60 MG: 60 INJECTION SUBCUTANEOUS at 01:50

## 2018-04-18 RX ADMIN — Medication 10 ML: at 20:42

## 2018-04-18 RX ADMIN — IPRATROPIUM BROMIDE AND ALBUTEROL SULFATE 1 AMPULE: .5; 3 SOLUTION RESPIRATORY (INHALATION) at 16:45

## 2018-04-18 RX ADMIN — INSULIN LISPRO 2 UNITS: 100 INJECTION, SOLUTION INTRAVENOUS; SUBCUTANEOUS at 17:05

## 2018-04-18 RX ADMIN — IPRATROPIUM BROMIDE AND ALBUTEROL SULFATE 1 AMPULE: .5; 3 SOLUTION RESPIRATORY (INHALATION) at 00:13

## 2018-04-18 RX ADMIN — IPRATROPIUM BROMIDE AND ALBUTEROL SULFATE 1 AMPULE: .5; 3 SOLUTION RESPIRATORY (INHALATION) at 13:13

## 2018-04-18 RX ADMIN — METHYLPREDNISOLONE SODIUM SUCCINATE 40 MG: 40 INJECTION, POWDER, FOR SOLUTION INTRAMUSCULAR; INTRAVENOUS at 09:22

## 2018-04-18 ASSESSMENT — PAIN SCALES - GENERAL
PAINLEVEL_OUTOF10: 0

## 2018-04-19 LAB
ALLEN TEST: POSITIVE
BASE EXCESS (CALCULATED): 9.7 MMOL/L (ref -2.5–2.5)
COLLECTED BY:: ABNORMAL
DEVICE: ABNORMAL
GLUCOSE BLD-MCNC: 124 MG/DL (ref 70–108)
GLUCOSE BLD-MCNC: 152 MG/DL (ref 70–108)
GLUCOSE BLD-MCNC: 218 MG/DL (ref 70–108)
GLUCOSE BLD-MCNC: 219 MG/DL (ref 70–108)
HCO3: 38 MMOL/L (ref 23–28)
IFIO2: 2
INR BLD: 1.13 (ref 0.85–1.13)
O2 SATURATION: 94 %
PCO2: 66 MMHG (ref 35–45)
PH BLOOD GAS: 7.37 (ref 7.35–7.45)
PO2: 76 MMHG (ref 71–104)
SOURCE, BLOOD GAS: ABNORMAL

## 2018-04-19 PROCEDURE — G8979 MOBILITY GOAL STATUS: HCPCS

## 2018-04-19 PROCEDURE — 2060000000 HC ICU INTERMEDIATE R&B

## 2018-04-19 PROCEDURE — 2580000003 HC RX 258: Performed by: FAMILY MEDICINE

## 2018-04-19 PROCEDURE — 94640 AIRWAY INHALATION TREATMENT: CPT

## 2018-04-19 PROCEDURE — 6360000002 HC RX W HCPCS: Performed by: INTERNAL MEDICINE

## 2018-04-19 PROCEDURE — 6370000000 HC RX 637 (ALT 250 FOR IP): Performed by: INTERNAL MEDICINE

## 2018-04-19 PROCEDURE — 6360000002 HC RX W HCPCS: Performed by: FAMILY MEDICINE

## 2018-04-19 PROCEDURE — 97161 PT EVAL LOW COMPLEX 20 MIN: CPT

## 2018-04-19 PROCEDURE — 94660 CPAP INITIATION&MGMT: CPT

## 2018-04-19 PROCEDURE — 36415 COLL VENOUS BLD VENIPUNCTURE: CPT

## 2018-04-19 PROCEDURE — 99232 SBSQ HOSP IP/OBS MODERATE 35: CPT | Performed by: INTERNAL MEDICINE

## 2018-04-19 PROCEDURE — APPSS30 APP SPLIT SHARED TIME 16-30 MINUTES: Performed by: NURSE PRACTITIONER

## 2018-04-19 PROCEDURE — 82948 REAGENT STRIP/BLOOD GLUCOSE: CPT

## 2018-04-19 PROCEDURE — 6370000000 HC RX 637 (ALT 250 FOR IP): Performed by: FAMILY MEDICINE

## 2018-04-19 PROCEDURE — 97110 THERAPEUTIC EXERCISES: CPT

## 2018-04-19 PROCEDURE — 82803 BLOOD GASES ANY COMBINATION: CPT

## 2018-04-19 PROCEDURE — 2500000003 HC RX 250 WO HCPCS: Performed by: INTERNAL MEDICINE

## 2018-04-19 PROCEDURE — 99233 SBSQ HOSP IP/OBS HIGH 50: CPT | Performed by: INTERNAL MEDICINE

## 2018-04-19 PROCEDURE — G8978 MOBILITY CURRENT STATUS: HCPCS

## 2018-04-19 PROCEDURE — 85610 PROTHROMBIN TIME: CPT

## 2018-04-19 PROCEDURE — 36600 WITHDRAWAL OF ARTERIAL BLOOD: CPT

## 2018-04-19 PROCEDURE — 6370000000 HC RX 637 (ALT 250 FOR IP): Performed by: NURSE PRACTITIONER

## 2018-04-19 PROCEDURE — 2700000000 HC OXYGEN THERAPY PER DAY

## 2018-04-19 PROCEDURE — 2580000003 HC RX 258: Performed by: INTERNAL MEDICINE

## 2018-04-19 RX ORDER — PREDNISONE 20 MG/1
40 TABLET ORAL DAILY
Status: COMPLETED | OUTPATIENT
Start: 2018-04-20 | End: 2018-04-21

## 2018-04-19 RX ORDER — HYDROCHLOROTHIAZIDE 12.5 MG/1
25 CAPSULE, GELATIN COATED ORAL ONCE
Status: COMPLETED | OUTPATIENT
Start: 2018-04-19 | End: 2018-04-19

## 2018-04-19 RX ORDER — DOXAZOSIN MESYLATE 1 MG/1
1 TABLET ORAL ONCE
Status: COMPLETED | OUTPATIENT
Start: 2018-04-19 | End: 2018-04-19

## 2018-04-19 RX ORDER — HYDRALAZINE HYDROCHLORIDE 20 MG/ML
5 INJECTION INTRAMUSCULAR; INTRAVENOUS EVERY 4 HOURS PRN
Status: DISCONTINUED | OUTPATIENT
Start: 2018-04-19 | End: 2018-04-19

## 2018-04-19 RX ORDER — CLONIDINE HYDROCHLORIDE 0.1 MG/1
0.1 TABLET ORAL ONCE
Status: COMPLETED | OUTPATIENT
Start: 2018-04-19 | End: 2018-04-19

## 2018-04-19 RX ORDER — HYDRALAZINE HYDROCHLORIDE 20 MG/ML
10 INJECTION INTRAMUSCULAR; INTRAVENOUS EVERY 6 HOURS PRN
Status: DISCONTINUED | OUTPATIENT
Start: 2018-04-19 | End: 2018-04-21 | Stop reason: HOSPADM

## 2018-04-19 RX ORDER — HYDROCHLOROTHIAZIDE 25 MG/1
50 TABLET ORAL DAILY
Status: DISCONTINUED | OUTPATIENT
Start: 2018-04-20 | End: 2018-04-21 | Stop reason: HOSPADM

## 2018-04-19 RX ORDER — TERAZOSIN 1 MG/1
1 CAPSULE ORAL ONCE
Status: DISCONTINUED | OUTPATIENT
Start: 2018-04-19 | End: 2018-04-19 | Stop reason: CLARIF

## 2018-04-19 RX ORDER — CLONIDINE HYDROCHLORIDE 0.2 MG/1
0.2 TABLET ORAL 2 TIMES DAILY
Status: DISCONTINUED | OUTPATIENT
Start: 2018-04-19 | End: 2018-04-20

## 2018-04-19 RX ORDER — LABETALOL HYDROCHLORIDE 5 MG/ML
5 INJECTION, SOLUTION INTRAVENOUS EVERY 4 HOURS PRN
Status: DISCONTINUED | OUTPATIENT
Start: 2018-04-19 | End: 2018-04-20

## 2018-04-19 RX ADMIN — PANTOPRAZOLE SODIUM 40 MG: 40 TABLET, DELAYED RELEASE ORAL at 05:51

## 2018-04-19 RX ADMIN — Medication 10 ML: at 20:54

## 2018-04-19 RX ADMIN — LEVOTHYROXINE SODIUM 50 MCG: 50 TABLET ORAL at 05:51

## 2018-04-19 RX ADMIN — DOXYCYCLINE 100 MG: 100 INJECTION, POWDER, LYOPHILIZED, FOR SOLUTION INTRAVENOUS at 18:38

## 2018-04-19 RX ADMIN — IPRATROPIUM BROMIDE AND ALBUTEROL SULFATE 1 AMPULE: .5; 3 SOLUTION RESPIRATORY (INHALATION) at 09:05

## 2018-04-19 RX ADMIN — CLONIDINE HYDROCHLORIDE 0.2 MG: 0.2 TABLET ORAL at 20:48

## 2018-04-19 RX ADMIN — PIPERACILLIN SODIUM AND TAZOBACTAM SODIUM 3.38 G: 3; .375 INJECTION, POWDER, LYOPHILIZED, FOR SOLUTION INTRAVENOUS at 22:04

## 2018-04-19 RX ADMIN — HYDROCHLOROTHIAZIDE 25 MG: 25 TABLET ORAL at 07:50

## 2018-04-19 RX ADMIN — PIPERACILLIN SODIUM AND TAZOBACTAM SODIUM 3.38 G: 3; .375 INJECTION, POWDER, LYOPHILIZED, FOR SOLUTION INTRAVENOUS at 14:06

## 2018-04-19 RX ADMIN — IPRATROPIUM BROMIDE AND ALBUTEROL SULFATE 1 AMPULE: .5; 3 SOLUTION RESPIRATORY (INHALATION) at 05:27

## 2018-04-19 RX ADMIN — Medication 2 UNITS: at 20:49

## 2018-04-19 RX ADMIN — ACYCLOVIR: 50 OINTMENT TOPICAL at 20:49

## 2018-04-19 RX ADMIN — HYDRALAZINE HYDROCHLORIDE 5 MG: 20 INJECTION INTRAMUSCULAR; INTRAVENOUS at 16:19

## 2018-04-19 RX ADMIN — CLONIDINE HYDROCHLORIDE 0.1 MG: 0.1 TABLET ORAL at 07:50

## 2018-04-19 RX ADMIN — IPRATROPIUM BROMIDE AND ALBUTEROL SULFATE 1 AMPULE: .5; 3 SOLUTION RESPIRATORY (INHALATION) at 16:04

## 2018-04-19 RX ADMIN — ENOXAPARIN SODIUM 60 MG: 60 INJECTION SUBCUTANEOUS at 14:03

## 2018-04-19 RX ADMIN — SERTRALINE 50 MG: 50 TABLET, FILM COATED ORAL at 07:50

## 2018-04-19 RX ADMIN — IPRATROPIUM BROMIDE AND ALBUTEROL SULFATE 1 AMPULE: .5; 3 SOLUTION RESPIRATORY (INHALATION) at 21:04

## 2018-04-19 RX ADMIN — METHYLPREDNISOLONE SODIUM SUCCINATE 40 MG: 40 INJECTION, POWDER, FOR SOLUTION INTRAMUSCULAR; INTRAVENOUS at 09:41

## 2018-04-19 RX ADMIN — INSULIN LISPRO 2 UNITS: 100 INJECTION, SOLUTION INTRAVENOUS; SUBCUTANEOUS at 12:16

## 2018-04-19 RX ADMIN — Medication 1 CAPSULE: at 07:51

## 2018-04-19 RX ADMIN — HYDRALAZINE HYDROCHLORIDE 5 MG: 20 INJECTION INTRAMUSCULAR; INTRAVENOUS at 20:48

## 2018-04-19 RX ADMIN — CLONIDINE HYDROCHLORIDE 0.1 MG: 0.1 TABLET ORAL at 09:39

## 2018-04-19 RX ADMIN — DOXAZOSIN 1 MG: 1 TABLET ORAL at 22:44

## 2018-04-19 RX ADMIN — PIPERACILLIN SODIUM AND TAZOBACTAM SODIUM 3.38 G: 3; .375 INJECTION, POWDER, LYOPHILIZED, FOR SOLUTION INTRAVENOUS at 05:50

## 2018-04-19 RX ADMIN — INSULIN LISPRO 4 UNITS: 100 INJECTION, SOLUTION INTRAVENOUS; SUBCUTANEOUS at 17:10

## 2018-04-19 RX ADMIN — IPRATROPIUM BROMIDE AND ALBUTEROL SULFATE 1 AMPULE: .5; 3 SOLUTION RESPIRATORY (INHALATION) at 23:57

## 2018-04-19 RX ADMIN — DOXYCYCLINE 100 MG: 100 INJECTION, POWDER, LYOPHILIZED, FOR SOLUTION INTRAVENOUS at 04:31

## 2018-04-19 RX ADMIN — ACYCLOVIR: 50 OINTMENT TOPICAL at 14:02

## 2018-04-19 RX ADMIN — ASPIRIN 81 MG 81 MG: 81 TABLET ORAL at 07:50

## 2018-04-19 RX ADMIN — ACETAMINOPHEN 650 MG: 325 TABLET ORAL at 02:49

## 2018-04-19 RX ADMIN — HYDROCHLOROTHIAZIDE 25 MG: 12.5 CAPSULE ORAL at 09:39

## 2018-04-19 RX ADMIN — AMLODIPINE BESYLATE 10 MG: 10 TABLET ORAL at 07:50

## 2018-04-19 RX ADMIN — IPRATROPIUM BROMIDE AND ALBUTEROL SULFATE 1 AMPULE: .5; 3 SOLUTION RESPIRATORY (INHALATION) at 12:35

## 2018-04-19 RX ADMIN — ACYCLOVIR: 50 OINTMENT TOPICAL at 07:52

## 2018-04-19 RX ADMIN — IPRATROPIUM BROMIDE AND ALBUTEROL SULFATE 1 AMPULE: .5; 3 SOLUTION RESPIRATORY (INHALATION) at 00:27

## 2018-04-19 RX ADMIN — Medication 10 ML: at 09:40

## 2018-04-19 RX ADMIN — ENOXAPARIN SODIUM 60 MG: 60 INJECTION SUBCUTANEOUS at 01:43

## 2018-04-19 ASSESSMENT — PAIN SCALES - GENERAL
PAINLEVEL_OUTOF10: 0
PAINLEVEL_OUTOF10: 4
PAINLEVEL_OUTOF10: 0

## 2018-04-20 LAB
GLUCOSE BLD-MCNC: 131 MG/DL (ref 70–108)
GLUCOSE BLD-MCNC: 161 MG/DL (ref 70–108)
GLUCOSE BLD-MCNC: 170 MG/DL (ref 70–108)
GLUCOSE BLD-MCNC: 194 MG/DL (ref 70–108)
INR BLD: 1.12 (ref 0.85–1.13)

## 2018-04-20 PROCEDURE — APPSS30 APP SPLIT SHARED TIME 16-30 MINUTES: Performed by: NURSE PRACTITIONER

## 2018-04-20 PROCEDURE — 6370000000 HC RX 637 (ALT 250 FOR IP): Performed by: INTERNAL MEDICINE

## 2018-04-20 PROCEDURE — 6360000002 HC RX W HCPCS: Performed by: INTERNAL MEDICINE

## 2018-04-20 PROCEDURE — 94761 N-INVAS EAR/PLS OXIMETRY MLT: CPT

## 2018-04-20 PROCEDURE — 97165 OT EVAL LOW COMPLEX 30 MIN: CPT

## 2018-04-20 PROCEDURE — 94640 AIRWAY INHALATION TREATMENT: CPT

## 2018-04-20 PROCEDURE — 82948 REAGENT STRIP/BLOOD GLUCOSE: CPT

## 2018-04-20 PROCEDURE — 6360000002 HC RX W HCPCS: Performed by: FAMILY MEDICINE

## 2018-04-20 PROCEDURE — 2700000000 HC OXYGEN THERAPY PER DAY

## 2018-04-20 PROCEDURE — 99232 SBSQ HOSP IP/OBS MODERATE 35: CPT | Performed by: INTERNAL MEDICINE

## 2018-04-20 PROCEDURE — 6370000000 HC RX 637 (ALT 250 FOR IP): Performed by: FAMILY MEDICINE

## 2018-04-20 PROCEDURE — 6370000000 HC RX 637 (ALT 250 FOR IP): Performed by: NURSE PRACTITIONER

## 2018-04-20 PROCEDURE — 2580000003 HC RX 258: Performed by: INTERNAL MEDICINE

## 2018-04-20 PROCEDURE — 2580000003 HC RX 258: Performed by: FAMILY MEDICINE

## 2018-04-20 PROCEDURE — G8989 SELF CARE D/C STATUS: HCPCS

## 2018-04-20 PROCEDURE — 94762 N-INVAS EAR/PLS OXIMTRY CONT: CPT

## 2018-04-20 PROCEDURE — 2500000003 HC RX 250 WO HCPCS: Performed by: INTERNAL MEDICINE

## 2018-04-20 PROCEDURE — G8987 SELF CARE CURRENT STATUS: HCPCS

## 2018-04-20 PROCEDURE — 36415 COLL VENOUS BLD VENIPUNCTURE: CPT

## 2018-04-20 PROCEDURE — 85610 PROTHROMBIN TIME: CPT

## 2018-04-20 PROCEDURE — 97530 THERAPEUTIC ACTIVITIES: CPT

## 2018-04-20 PROCEDURE — 94660 CPAP INITIATION&MGMT: CPT

## 2018-04-20 PROCEDURE — 2060000000 HC ICU INTERMEDIATE R&B

## 2018-04-20 RX ORDER — CLONIDINE HYDROCHLORIDE 0.1 MG/1
0.1 TABLET ORAL ONCE
Status: COMPLETED | OUTPATIENT
Start: 2018-04-20 | End: 2018-04-20

## 2018-04-20 RX ADMIN — IPRATROPIUM BROMIDE AND ALBUTEROL SULFATE 1 AMPULE: .5; 3 SOLUTION RESPIRATORY (INHALATION) at 17:26

## 2018-04-20 RX ADMIN — Medication 1 CAPSULE: at 08:06

## 2018-04-20 RX ADMIN — LEVOTHYROXINE SODIUM 50 MCG: 50 TABLET ORAL at 06:03

## 2018-04-20 RX ADMIN — PREDNISONE 40 MG: 20 TABLET ORAL at 08:05

## 2018-04-20 RX ADMIN — CLONIDINE HYDROCHLORIDE 0.3 MG: 0.2 TABLET ORAL at 21:08

## 2018-04-20 RX ADMIN — ENOXAPARIN SODIUM 60 MG: 60 INJECTION SUBCUTANEOUS at 03:44

## 2018-04-20 RX ADMIN — DOXYCYCLINE 100 MG: 100 INJECTION, POWDER, LYOPHILIZED, FOR SOLUTION INTRAVENOUS at 22:17

## 2018-04-20 RX ADMIN — Medication 10 ML: at 21:17

## 2018-04-20 RX ADMIN — ACYCLOVIR: 50 OINTMENT TOPICAL at 08:09

## 2018-04-20 RX ADMIN — IPRATROPIUM BROMIDE AND ALBUTEROL SULFATE 1 AMPULE: .5; 3 SOLUTION RESPIRATORY (INHALATION) at 08:40

## 2018-04-20 RX ADMIN — IPRATROPIUM BROMIDE AND ALBUTEROL SULFATE 1 AMPULE: .5; 3 SOLUTION RESPIRATORY (INHALATION) at 04:04

## 2018-04-20 RX ADMIN — PIPERACILLIN SODIUM AND TAZOBACTAM SODIUM 3.38 G: 3; .375 INJECTION, POWDER, LYOPHILIZED, FOR SOLUTION INTRAVENOUS at 23:34

## 2018-04-20 RX ADMIN — Medication 10 ML: at 08:17

## 2018-04-20 RX ADMIN — CLONIDINE HYDROCHLORIDE 0.1 MG: 0.1 TABLET ORAL at 10:28

## 2018-04-20 RX ADMIN — PIPERACILLIN SODIUM AND TAZOBACTAM SODIUM 3.38 G: 3; .375 INJECTION, POWDER, LYOPHILIZED, FOR SOLUTION INTRAVENOUS at 17:05

## 2018-04-20 RX ADMIN — HYDROCHLOROTHIAZIDE 50 MG: 25 TABLET ORAL at 08:05

## 2018-04-20 RX ADMIN — Medication 10 ML: at 03:45

## 2018-04-20 RX ADMIN — PIPERACILLIN SODIUM AND TAZOBACTAM SODIUM 3.38 G: 3; .375 INJECTION, POWDER, LYOPHILIZED, FOR SOLUTION INTRAVENOUS at 08:17

## 2018-04-20 RX ADMIN — SERTRALINE 50 MG: 50 TABLET, FILM COATED ORAL at 08:06

## 2018-04-20 RX ADMIN — CLONIDINE HYDROCHLORIDE 0.2 MG: 0.2 TABLET ORAL at 08:06

## 2018-04-20 RX ADMIN — INSULIN LISPRO 2 UNITS: 100 INJECTION, SOLUTION INTRAVENOUS; SUBCUTANEOUS at 12:15

## 2018-04-20 RX ADMIN — DOXYCYCLINE 100 MG: 100 INJECTION, POWDER, LYOPHILIZED, FOR SOLUTION INTRAVENOUS at 05:57

## 2018-04-20 RX ADMIN — ENOXAPARIN SODIUM 60 MG: 60 INJECTION SUBCUTANEOUS at 14:16

## 2018-04-20 RX ADMIN — HYDRALAZINE HYDROCHLORIDE 10 MG: 20 INJECTION INTRAMUSCULAR; INTRAVENOUS at 03:47

## 2018-04-20 RX ADMIN — ACYCLOVIR: 50 OINTMENT TOPICAL at 14:14

## 2018-04-20 RX ADMIN — INSULIN LISPRO 2 UNITS: 100 INJECTION, SOLUTION INTRAVENOUS; SUBCUTANEOUS at 17:05

## 2018-04-20 RX ADMIN — AMLODIPINE BESYLATE 10 MG: 10 TABLET ORAL at 08:06

## 2018-04-20 RX ADMIN — ASPIRIN 81 MG 81 MG: 81 TABLET ORAL at 08:08

## 2018-04-20 RX ADMIN — IPRATROPIUM BROMIDE AND ALBUTEROL SULFATE 1 AMPULE: .5; 3 SOLUTION RESPIRATORY (INHALATION) at 12:17

## 2018-04-20 RX ADMIN — ACYCLOVIR: 50 OINTMENT TOPICAL at 21:09

## 2018-04-20 RX ADMIN — IPRATROPIUM BROMIDE AND ALBUTEROL SULFATE 1 AMPULE: .5; 3 SOLUTION RESPIRATORY (INHALATION) at 21:47

## 2018-04-20 RX ADMIN — PANTOPRAZOLE SODIUM 40 MG: 40 TABLET, DELAYED RELEASE ORAL at 06:03

## 2018-04-20 RX ADMIN — Medication 1 UNITS: at 21:09

## 2018-04-20 ASSESSMENT — PAIN SCALES - GENERAL
PAINLEVEL_OUTOF10: 0

## 2018-04-21 VITALS
RESPIRATION RATE: 20 BRPM | TEMPERATURE: 97.5 F | OXYGEN SATURATION: 91 % | HEART RATE: 67 BPM | DIASTOLIC BLOOD PRESSURE: 73 MMHG | BODY MASS INDEX: 50.02 KG/M2 | WEIGHT: 293 LBS | HEIGHT: 64 IN | SYSTOLIC BLOOD PRESSURE: 138 MMHG

## 2018-04-21 LAB
GLUCOSE BLD-MCNC: 116 MG/DL (ref 70–108)
GLUCOSE BLD-MCNC: 143 MG/DL (ref 70–108)
GLUCOSE BLD-MCNC: 144 MG/DL (ref 70–108)
GLUCOSE BLD-MCNC: 260 MG/DL (ref 70–108)
INR BLD: 0.97 (ref 0.85–1.13)

## 2018-04-21 PROCEDURE — 6370000000 HC RX 637 (ALT 250 FOR IP): Performed by: NURSE PRACTITIONER

## 2018-04-21 PROCEDURE — 99239 HOSP IP/OBS DSCHRG MGMT >30: CPT | Performed by: INTERNAL MEDICINE

## 2018-04-21 PROCEDURE — 6360000002 HC RX W HCPCS: Performed by: FAMILY MEDICINE

## 2018-04-21 PROCEDURE — APPSS30 APP SPLIT SHARED TIME 16-30 MINUTES: Performed by: NURSE PRACTITIONER

## 2018-04-21 PROCEDURE — 2580000003 HC RX 258: Performed by: FAMILY MEDICINE

## 2018-04-21 PROCEDURE — 94640 AIRWAY INHALATION TREATMENT: CPT

## 2018-04-21 PROCEDURE — 93226 XTRNL ECG REC<48 HR SCAN A/R: CPT

## 2018-04-21 PROCEDURE — 99232 SBSQ HOSP IP/OBS MODERATE 35: CPT | Performed by: INTERNAL MEDICINE

## 2018-04-21 PROCEDURE — 6370000000 HC RX 637 (ALT 250 FOR IP): Performed by: INTERNAL MEDICINE

## 2018-04-21 PROCEDURE — 93225 XTRNL ECG REC<48 HRS REC: CPT

## 2018-04-21 PROCEDURE — 82948 REAGENT STRIP/BLOOD GLUCOSE: CPT

## 2018-04-21 PROCEDURE — 2500000003 HC RX 250 WO HCPCS: Performed by: INTERNAL MEDICINE

## 2018-04-21 PROCEDURE — 2580000003 HC RX 258: Performed by: INTERNAL MEDICINE

## 2018-04-21 PROCEDURE — 6360000002 HC RX W HCPCS: Performed by: INTERNAL MEDICINE

## 2018-04-21 PROCEDURE — 36415 COLL VENOUS BLD VENIPUNCTURE: CPT

## 2018-04-21 PROCEDURE — 6370000000 HC RX 637 (ALT 250 FOR IP): Performed by: FAMILY MEDICINE

## 2018-04-21 PROCEDURE — 85610 PROTHROMBIN TIME: CPT

## 2018-04-21 PROCEDURE — 2700000000 HC OXYGEN THERAPY PER DAY

## 2018-04-21 RX ORDER — AMLODIPINE BESYLATE 10 MG/1
10 TABLET ORAL DAILY
Qty: 30 TABLET | Refills: 3 | Status: SHIPPED | OUTPATIENT
Start: 2018-04-22

## 2018-04-21 RX ORDER — ACYCLOVIR 50 MG/G
OINTMENT TOPICAL
Qty: 1 TUBE | Refills: 0 | Status: SHIPPED | OUTPATIENT
Start: 2018-04-21 | End: 2018-04-28

## 2018-04-21 RX ORDER — HYDROCHLOROTHIAZIDE 50 MG/1
50 TABLET ORAL DAILY
Qty: 30 TABLET | Refills: 3 | Status: ON HOLD | OUTPATIENT
Start: 2018-04-22 | End: 2019-06-18 | Stop reason: HOSPADM

## 2018-04-21 RX ADMIN — IPRATROPIUM BROMIDE AND ALBUTEROL SULFATE 1 AMPULE: .5; 3 SOLUTION RESPIRATORY (INHALATION) at 01:36

## 2018-04-21 RX ADMIN — PIPERACILLIN SODIUM AND TAZOBACTAM SODIUM 3.38 G: 3; .375 INJECTION, POWDER, LYOPHILIZED, FOR SOLUTION INTRAVENOUS at 06:43

## 2018-04-21 RX ADMIN — ACYCLOVIR: 50 OINTMENT TOPICAL at 08:25

## 2018-04-21 RX ADMIN — ENOXAPARIN SODIUM 60 MG: 60 INJECTION SUBCUTANEOUS at 02:05

## 2018-04-21 RX ADMIN — INSULIN LISPRO 2 UNITS: 100 INJECTION, SOLUTION INTRAVENOUS; SUBCUTANEOUS at 12:18

## 2018-04-21 RX ADMIN — IPRATROPIUM BROMIDE AND ALBUTEROL SULFATE 1 AMPULE: .5; 3 SOLUTION RESPIRATORY (INHALATION) at 13:51

## 2018-04-21 RX ADMIN — CLONIDINE HYDROCHLORIDE 0.3 MG: 0.2 TABLET ORAL at 08:28

## 2018-04-21 RX ADMIN — IPRATROPIUM BROMIDE AND ALBUTEROL SULFATE 1 AMPULE: .5; 3 SOLUTION RESPIRATORY (INHALATION) at 05:19

## 2018-04-21 RX ADMIN — INSULIN LISPRO 6 UNITS: 100 INJECTION, SOLUTION INTRAVENOUS; SUBCUTANEOUS at 18:12

## 2018-04-21 RX ADMIN — DOXYCYCLINE 100 MG: 100 INJECTION, POWDER, LYOPHILIZED, FOR SOLUTION INTRAVENOUS at 05:34

## 2018-04-21 RX ADMIN — LEVOTHYROXINE SODIUM 50 MCG: 50 TABLET ORAL at 08:09

## 2018-04-21 RX ADMIN — PREDNISONE 40 MG: 20 TABLET ORAL at 08:27

## 2018-04-21 RX ADMIN — IPRATROPIUM BROMIDE AND ALBUTEROL SULFATE 1 AMPULE: .5; 3 SOLUTION RESPIRATORY (INHALATION) at 17:50

## 2018-04-21 RX ADMIN — ACYCLOVIR: 50 OINTMENT TOPICAL at 14:34

## 2018-04-21 RX ADMIN — AMLODIPINE BESYLATE 10 MG: 10 TABLET ORAL at 08:27

## 2018-04-21 RX ADMIN — PIPERACILLIN SODIUM AND TAZOBACTAM SODIUM 3.38 G: 3; .375 INJECTION, POWDER, LYOPHILIZED, FOR SOLUTION INTRAVENOUS at 14:34

## 2018-04-21 RX ADMIN — DOXYCYCLINE 100 MG: 100 INJECTION, POWDER, LYOPHILIZED, FOR SOLUTION INTRAVENOUS at 19:12

## 2018-04-21 RX ADMIN — IPRATROPIUM BROMIDE AND ALBUTEROL SULFATE 1 AMPULE: .5; 3 SOLUTION RESPIRATORY (INHALATION) at 10:07

## 2018-04-21 RX ADMIN — ENOXAPARIN SODIUM 60 MG: 60 INJECTION SUBCUTANEOUS at 14:34

## 2018-04-21 RX ADMIN — Medication 1 CAPSULE: at 08:27

## 2018-04-21 RX ADMIN — HYDROCHLOROTHIAZIDE 50 MG: 25 TABLET ORAL at 08:28

## 2018-04-21 RX ADMIN — Medication 10 ML: at 11:00

## 2018-04-21 RX ADMIN — PANTOPRAZOLE SODIUM 40 MG: 40 TABLET, DELAYED RELEASE ORAL at 06:43

## 2018-04-21 RX ADMIN — SERTRALINE 50 MG: 50 TABLET, FILM COATED ORAL at 08:28

## 2018-04-21 RX ADMIN — ASPIRIN 81 MG 81 MG: 81 TABLET ORAL at 12:12

## 2018-04-21 ASSESSMENT — PAIN SCALES - GENERAL: PAINLEVEL_OUTOF10: 0

## 2018-05-16 ENCOUNTER — OFFICE VISIT (OUTPATIENT)
Dept: CARDIOLOGY CLINIC | Age: 63
End: 2018-05-16
Payer: MEDICARE

## 2018-05-16 VITALS
BODY MASS INDEX: 50.02 KG/M2 | HEIGHT: 64 IN | DIASTOLIC BLOOD PRESSURE: 82 MMHG | WEIGHT: 293 LBS | SYSTOLIC BLOOD PRESSURE: 138 MMHG | HEART RATE: 92 BPM

## 2018-05-16 DIAGNOSIS — E66.2 OBESITY HYPOVENTILATION SYNDROME (HCC): ICD-10-CM

## 2018-05-16 DIAGNOSIS — E66.01 MORBID OBESITY (HCC): ICD-10-CM

## 2018-05-16 DIAGNOSIS — I50.32 CHRONIC DIASTOLIC CONGESTIVE HEART FAILURE (HCC): Primary | ICD-10-CM

## 2018-05-16 DIAGNOSIS — E11.9 TYPE 2 DIABETES MELLITUS WITHOUT COMPLICATION, WITHOUT LONG-TERM CURRENT USE OF INSULIN (HCC): ICD-10-CM

## 2018-05-16 DIAGNOSIS — I10 ESSENTIAL HYPERTENSION: ICD-10-CM

## 2018-05-16 PROCEDURE — 1090F PRES/ABSN URINE INCON ASSESS: CPT | Performed by: PHYSICIAN ASSISTANT

## 2018-05-16 PROCEDURE — 99213 OFFICE O/P EST LOW 20 MIN: CPT | Performed by: PHYSICIAN ASSISTANT

## 2018-05-16 PROCEDURE — 3044F HG A1C LEVEL LT 7.0%: CPT | Performed by: PHYSICIAN ASSISTANT

## 2018-05-16 PROCEDURE — 1111F DSCHRG MED/CURRENT MED MERGE: CPT | Performed by: PHYSICIAN ASSISTANT

## 2018-05-16 PROCEDURE — 4040F PNEUMOC VAC/ADMIN/RCVD: CPT | Performed by: PHYSICIAN ASSISTANT

## 2018-05-16 PROCEDURE — 1036F TOBACCO NON-USER: CPT | Performed by: PHYSICIAN ASSISTANT

## 2018-05-16 PROCEDURE — G8400 PT W/DXA NO RESULTS DOC: HCPCS | Performed by: PHYSICIAN ASSISTANT

## 2018-05-16 PROCEDURE — 1123F ACP DISCUSS/DSCN MKR DOCD: CPT | Performed by: PHYSICIAN ASSISTANT

## 2018-05-16 PROCEDURE — G8427 DOCREV CUR MEDS BY ELIG CLIN: HCPCS | Performed by: PHYSICIAN ASSISTANT

## 2018-05-16 PROCEDURE — 2022F DILAT RTA XM EVC RTNOPTHY: CPT | Performed by: PHYSICIAN ASSISTANT

## 2018-05-16 PROCEDURE — 3017F COLORECTAL CA SCREEN DOC REV: CPT | Performed by: PHYSICIAN ASSISTANT

## 2018-05-16 PROCEDURE — G8417 CALC BMI ABV UP PARAM F/U: HCPCS | Performed by: PHYSICIAN ASSISTANT

## 2018-05-16 RX ORDER — ACYCLOVIR 50 MG/G
OINTMENT TOPICAL
Status: ON HOLD | COMMUNITY
End: 2019-06-12 | Stop reason: ALTCHOICE

## 2018-09-18 ENCOUNTER — OFFICE VISIT (OUTPATIENT)
Dept: CARDIOLOGY CLINIC | Age: 63
End: 2018-09-18
Payer: MEDICARE

## 2018-09-18 VITALS
BODY MASS INDEX: 50.02 KG/M2 | HEART RATE: 60 BPM | HEIGHT: 64 IN | DIASTOLIC BLOOD PRESSURE: 82 MMHG | WEIGHT: 293 LBS | SYSTOLIC BLOOD PRESSURE: 138 MMHG

## 2018-09-18 DIAGNOSIS — I50.32 CHRONIC DIASTOLIC CONGESTIVE HEART FAILURE (HCC): Primary | ICD-10-CM

## 2018-09-18 PROCEDURE — 1101F PT FALLS ASSESS-DOCD LE1/YR: CPT | Performed by: INTERNAL MEDICINE

## 2018-09-18 PROCEDURE — 4040F PNEUMOC VAC/ADMIN/RCVD: CPT | Performed by: INTERNAL MEDICINE

## 2018-09-18 PROCEDURE — G8417 CALC BMI ABV UP PARAM F/U: HCPCS | Performed by: INTERNAL MEDICINE

## 2018-09-18 PROCEDURE — G8400 PT W/DXA NO RESULTS DOC: HCPCS | Performed by: INTERNAL MEDICINE

## 2018-09-18 PROCEDURE — 1036F TOBACCO NON-USER: CPT | Performed by: INTERNAL MEDICINE

## 2018-09-18 PROCEDURE — 3017F COLORECTAL CA SCREEN DOC REV: CPT | Performed by: INTERNAL MEDICINE

## 2018-09-18 PROCEDURE — 99213 OFFICE O/P EST LOW 20 MIN: CPT | Performed by: INTERNAL MEDICINE

## 2018-09-18 PROCEDURE — G8427 DOCREV CUR MEDS BY ELIG CLIN: HCPCS | Performed by: INTERNAL MEDICINE

## 2018-09-18 PROCEDURE — 1123F ACP DISCUSS/DSCN MKR DOCD: CPT | Performed by: INTERNAL MEDICINE

## 2018-09-18 PROCEDURE — 1090F PRES/ABSN URINE INCON ASSESS: CPT | Performed by: INTERNAL MEDICINE

## 2018-09-18 RX ORDER — ALBUTEROL SULFATE 90 UG/1
2 AEROSOL, METERED RESPIRATORY (INHALATION) EVERY 6 HOURS PRN
COMMUNITY

## 2018-09-18 NOTE — PROGRESS NOTES
[facility] C[facility]  [address]  [address] [place] [address] Road [facility]  Dept: [phone]  Dept Fax: [phone]  Loc: [phone]    Visit Date: 9/18/2018    Ms. Nora Zavala is a 70 y.o. female  who presented for:  Chief Complaint   Patient presents with    3 Month Follow-Up    Congestive Heart Failure       HPI:   HPI   69 yo F who presents for follow-up. Had diastolic CHF exacerbation. She was placed on CPAP and her apnea episodes have decreased significantly and she now has improved energy and daytime awareness. BP improving. Taking ASA daily. No chest pain, angina, LAZO, orthopnea, PND, sob at rest, palpitations, LE edema, or syncope. Current Outpatient Prescriptions:     albuterol sulfate  (90 Base) MCG/ACT inhaler, Inhale 2 puffs into the lungs every 6 hours as needed for Wheezing, Disp: , Rfl:     metFORMIN (GLUCOPHAGE) 1000 MG tablet, Take 2,000 mg by mouth 2 times daily (with meals), Disp: , Rfl:     acyclovir (ZOVIRAX) 5 % ointment, Apply topically every 3 hours Apply topically every 3 hours. , Disp: , Rfl:     amLODIPine (NORVASC) 10 MG tablet, Take 1 tablet by mouth daily, Disp: 30 tablet, Rfl: 3    hydrochlorothiazide (HYDRODIURIL) 50 MG tablet, Take 1 tablet by mouth daily, Disp: 30 tablet, Rfl: 3    cloNIDine (CATAPRES) 0.1 MG tablet, Take 0.1 mg by mouth 2 times daily, Disp: , Rfl:     levothyroxine (SYNTHROID) 50 MCG tablet, Take 1 tablet by mouth Daily, Disp: 30 tablet, Rfl: 5    acetaminophen (TYLENOL) 325 MG tablet, Take 650 mg by mouth every 8 hours, Disp: , Rfl:     pantoprazole (PROTONIX) 40 MG tablet, Take 40 mg by mouth daily, Disp: , Rfl:     aspirin 81 MG tablet, Take 81 mg by mouth daily, Disp: , Rfl:     fluticasone-salmeterol (ADVAIR) 250-50 MCG/DOSE AEPB, Inhale 1 puff into the lungs every 12 hours, Disp: , Rfl:     sertraline (ZOLOFT) 50 MG tablet, Take 50 mg by mouth daily, Disp: , Rfl:     Past Medical History  Giulia Love  has a past medical history of Asthma; Blood circulation, collateral; COPD (chronic obstructive pulmonary disease) ([facility] [address] COPD (chronic obstructive pulmonary disease) ([facility] [address] Fibromyalgia; Hyperlipidemia; Hypertension; Hypothyroidism; Pickwickian syndrome ([facility] [address] Psychiatric problem; Restless leg; Sleep apnea; and Type 2 diabetes mellitus without complication ([facility] [address] Social History  Giulia Love  reports that she has never smoked. She has never used smokeless tobacco. She reports that she does not drink alcohol or use drugs. Family History  Giulia Love family history includes Diabetes in her brother, father, and mother; Heart Disease in her mother. There is no family history of bicuspid aortic valve, aneurysms, heart transplant, pacemakers, defibrillators, or sudden cardiac death. Past Surgical History   Past Surgical History:   Procedure Laterality Date    APPENDECTOMY      CATARACT REMOVAL      CHOLECYSTECTOMY         Review of Systems   Constitutional: Negative for chills and fever  HENT: Negative for congestion, sinus pressure, sneezing and sore throat. Eyes: Negative for pain, discharge, redness and itching. Respiratory: Negative for apnea, cough  Gastrointestinal: Negative for blood in stool, constipation, diarrhea   Endocrine: Negative for cold intolerance, heat intolerance, polydipsia. Genitourinary: Negative for dysuria, enuresis, flank pain and hematuria. Musculoskeletal: Negative for arthralgias, joint swelling and neck pain. Neurological: Negative for numbness and headaches. Psychiatric/Behavioral: Negative for agitation, confusion, decreased concentration and dysphoric mood.      Objective:     /82   Pulse 60   Ht 5' 4\" (1.626 m)   Wt (!) 346 lb 3.2 oz (157 kg)   BMI 59.43 kg/m²     Wt Readings from Last 3 Encounters:   09/18/18 (!) 346 lb 3.2 oz (157 kg)   05/16/18 (!) 362 lb 3.2 oz (164.3 kg)   04/21/18 (!) 378 lb 6.4 oz (171.6 kg)     BP Readings from Last 3 Encounters:   09/18/18 138/82   05/16/18 138/82   04/21/18 138/73       Nursing note and vitals reviewed. Physical Exam   Constitutional: Oriented to person, place, and time. Appears well-developed and well-nourished. HENT:   Head: Normocephalic and atraumatic. Eyes: EOM are normal. Pupils are equal, round, and reactive to light. Neck: Normal range of motion. Neck supple. No JVD present. Cardiovascular: Normal rate, regular rhythm, normal heart sounds and intact distal pulses. No murmur heard. Pulmonary/Chest: Effort normal and breath sounds normal. No respiratory distress. No wheezes. No rales. Abdominal: Soft. Bowel sounds are normal. No distension. There is no tenderness. Musculoskeletal: Normal range of motion. No edema. Neurological: Alert and oriented to person, place, and time. No cranial nerve deficit. Coordination normal.   Skin: Skin is warm and dry. Psychiatric: Normal mood and affect.        No results found for: CKTOTAL, CKMB, CKMBINDEX    Lab Results   Component Value Date    WBC 13.7 04/17/2018    RBC 4.21 04/17/2018    HGB 13.1 04/17/2018    HCT 40.0 04/17/2018    MCV 95.0 04/17/2018    MCH 31.1 04/17/2018    MCHC 32.7 04/17/2018    RDW 15.2 04/17/2018     04/17/2018    MPV 9.6 04/17/2018       Lab Results   Component Value Date     04/17/2018    K 5.1 04/17/2018     04/17/2018    CO2 30 04/17/2018    BUN 41 04/17/2018    CREATININE 0.9 04/17/2018    CALCIUM 9.6 04/17/2018    LABGLOM 63 04/17/2018    GLUCOSE 192 04/17/2018       No results found for: ALKPHOS, ALT, AST, PROT, BILITOT, BILIDIR, IBILI, LABALBU    No results found for: MG    Lab Results   Component Value Date    INR 0.97 04/21/2018    INR 1.12 04/20/2018    INR 1.13 04/19/2018         Lab Results   Component Value Date    LABA1C 6.5 04/17/2018       Lab Results   Component Value Date    TRIG 197 09/29/2017    HDL 34 09/29/2017    LDLCALC 93 09/29/2017       Lab Results   Component Value Date TSH 0.292 04/17/2018         Testing Reviewed:      I have individually reviewed the cardiac test below:    ECHO:   Results for orders placed during the hospital encounter of 04/16/18   ECHO Complete 2D W Doppler W Color    Narrative Transthoracic Echocardiography Report (TTE)     Demographics      Patient Name   Herman Bond  Gender               Female                  M      MR #           [id number]       Race                                                      Ethnicity      Account #      [de-identified]       Room Number          0020      Accession      [id number]       Date of Study        04/17/2018   Number      Date of Birth  1955      Referring Physician  MD Salo Nelson      Age            57 year(s)      Charles Espinosa MD                                  Physician     Procedure    Type of Study      TTE procedure:ECHOCARDIOGRAM COMPLETE 2D W DOPPLER W COLOR. Procedure Date  Date: 04/17/2018 Start: 09:26 AM    Study Location: Bedside  Technical Quality: Poor visualization due to body habitus. Indications:Evaluate left ventricular function and Acute respiratory  failure. Additional Medical History:Hypertension, Diabetic, Sleep apnea, GERD,  Bradycardia, Hyperlipidemia, COPD, Asthma. Patient Status: Routine    Height: 64 inches Weight: 403.01 pounds BSA: 2.63 m^2 BMI: 69.18 kg/m^2    BP: 132/63 mmHg     Conclusions      Summary   Technically difficult study. This study is essentially non-diagnostic due to poor acoustic windows. Cannot estimated LVEF due to extremely poor acoustic windows. Consider YAMILKA, MUGA, or cardiac MRI to evaluate cardiac function, valves,   and pericardium.       Signature ----------------------------------------------------------------   Electronically signed by Talib Trinidad MD (Interpreting   physician) on 04/17/2018 at 04:37 PM   ----------------------------------------------------------------      Findings      Mitral Valve   The mitral valve structure was not well seen. Aortic Valve   The aortic valve not well seen. Tricuspid Valve   The tricuspid valve structure was not well seen. Pulmonic Valve   The pulmonic valve leaflets were not well seen. Left Atrium   Left atrial size appeared grossly within normal limits. Left Ventricle   Cannot estimated LVEF due to extremely poor acoustic windows. Consider   YAMILKA, MUGA, or cardiac MRI. Right Atrium   Right atrial size could not be assessed. Right Ventricle   The right ventricle not well seen. Pericardial Effusion   The pericardium was not well seen. Pleural Effusion   Cannot assess. Aorta / Great Vessels   IVC not visualized.      M-Mode/2D Measurements & Calculations      LV Diastolic    LV Systolic Dimension:    AV Cusp Separation: 1.8 cmLA   Dimension: 5.8  4.2 cm                    Dimension: 3.8 cmAO Root   cm              LV Volume Diastolic: 111  Dimension: 3 cmLA Area: 27 cm^2   LV FS:27.6 %    ml   LV PW           LV Volume Systolic: 61.5   Diastolic: 1.5  ml   cm              LV EDV/LV EDV Index: 225  RV Diastolic Dimension: 2.4 cm   Septum          ml/63 m^2LV ESV/LV ESV   Diastolic: 1.5  Index: 68.0 ml/30 m^2     LA/Aorta: 1.27   cm              EF Calculated: 52.9 %                                             LA volume/Index: 88.4 ml /34m^2     Doppler Measurements & Calculations      MV Peak E-Wave: 130 cm/s   AV Peak Velocity: 157 LVOT Peak Velocity: 112   MV Peak A-Wave: 74 cm/s    cm/s                  cm/s   MV E/A Ratio: 1.76         AV Peak Gradient:     LVOT Peak Gradient: 5   MV Peak Gradient: 6.76     9.86 mmHg             mmHg   mmHg TV Peak E-Wave: 91.6 cm/s   MV Deceleration Time: 232                        TV Peak A-Wave: 81.2 cm/s   msec                              IVRT: 70 msec         TV Peak Gradient: 3.36                                                    mmHg   MV E' Septal Velocity:                           TR Velocity:262 cm/s   7.95 cm/s                  AV DVI (Vmax):0.71    TR Gradient:27.46 mmHg   MV A' Septal Velocity:                           PV Peak Velocity: 95 cm/s   6.55 cm/s                                        PV Peak Gradient: 3.61   MV E' Lateral Velocity:                          mmHg   9.63 cm/s   MV A' Lateral Velocity:   9.16 cm/s   E/E' septal: 16.35   E/E' lateral: 13.5   MR Velocity: 434 cm/s     [email or web address] [id number]        Assessment/Plan   Chronic diastolic dysfunction, NYHA II, stable  RON - CPAP  HTN  Doing very well since starting CPAP. Discussed diet/exercise/BP/weight loss/health lifestyle choices/lipids; the patient understands the goals and will try to comply. No CHF exacerbations.       Disposition:  prn    Electronically signed by Paris Miller MD   9/18/2018 at 3:19 PM

## 2019-06-11 ENCOUNTER — HOSPITAL ENCOUNTER (INPATIENT)
Age: 64
LOS: 7 days | Discharge: HOME | DRG: 683 | End: 2019-06-18
Attending: INTERNAL MEDICINE | Admitting: INTERNAL MEDICINE
Payer: MEDICARE

## 2019-06-11 DIAGNOSIS — N17.9 AKI (ACUTE KIDNEY INJURY) (HCC): Primary | ICD-10-CM

## 2019-06-11 DIAGNOSIS — E11.9 TYPE 2 DIABETES MELLITUS WITHOUT COMPLICATION, WITHOUT LONG-TERM CURRENT USE OF INSULIN (HCC): ICD-10-CM

## 2019-06-11 DIAGNOSIS — I10 ESSENTIAL HYPERTENSION: ICD-10-CM

## 2019-06-11 PROBLEM — R82.71 BACTERIURIA: Status: ACTIVE | Noted: 2019-06-11

## 2019-06-11 LAB
ANION GAP SERPL CALCULATED.3IONS-SCNC: 16 MEQ/L (ref 8–16)
BACTERIA: ABNORMAL
BASOPHILS # BLD: 0.5 %
BASOPHILS ABSOLUTE: 0.1 THOU/MM3 (ref 0–0.1)
BILIRUBIN URINE: NEGATIVE
BLOOD, URINE: NEGATIVE
BUN BLDV-MCNC: 69 MG/DL (ref 7–22)
CALCIUM IONIZED: 1.13 MMOL/L (ref 1.12–1.32)
CALCIUM SERPL-MCNC: 10.2 MG/DL (ref 8.5–10.5)
CASTS: ABNORMAL /LPF
CASTS: ABNORMAL /LPF
CHARACTER, URINE: CLEAR
CHLORIDE BLD-SCNC: 105 MEQ/L (ref 98–111)
CO2: 15 MEQ/L (ref 23–33)
COLOR: YELLOW
CREAT SERPL-MCNC: 3.4 MG/DL (ref 0.4–1.2)
CRYSTALS: ABNORMAL
EOSINOPHIL # BLD: 2.1 %
EOSINOPHILS ABSOLUTE: 0.3 THOU/MM3 (ref 0–0.4)
EPITHELIAL CELLS, UA: ABNORMAL /HPF
ERYTHROCYTE [DISTWIDTH] IN BLOOD BY AUTOMATED COUNT: 14.3 % (ref 11.5–14.5)
ERYTHROCYTE [DISTWIDTH] IN BLOOD BY AUTOMATED COUNT: 49.2 FL (ref 35–45)
GFR SERPL CREATININE-BSD FRML MDRD: 14 ML/MIN/1.73M2
GLUCOSE BLD-MCNC: 116 MG/DL (ref 70–108)
GLUCOSE BLD-MCNC: 117 MG/DL (ref 70–108)
GLUCOSE, URINE: NEGATIVE MG/DL
HCT VFR BLD CALC: 42.2 % (ref 37–47)
HEMOGLOBIN: 14 GM/DL (ref 12–16)
IMMATURE GRANS (ABS): 0.05 THOU/MM3 (ref 0–0.07)
IMMATURE GRANULOCYTES: 0.4 %
KETONES, URINE: NEGATIVE
LEUKOCYTE ESTERASE, URINE: ABNORMAL
LYMPHOCYTES # BLD: 20.2 %
LYMPHOCYTES ABSOLUTE: 2.6 THOU/MM3 (ref 1–4.8)
MAGNESIUM: 3 MG/DL (ref 1.6–2.4)
MCH RBC QN AUTO: 31.3 PG (ref 26–33)
MCHC RBC AUTO-ENTMCNC: 33.2 GM/DL (ref 32.2–35.5)
MCV RBC AUTO: 94.4 FL (ref 81–99)
MISCELLANEOUS LAB TEST RESULT: ABNORMAL
MONOCYTES # BLD: 4.5 %
MONOCYTES ABSOLUTE: 0.6 THOU/MM3 (ref 0.4–1.3)
NITRITE, URINE: NEGATIVE
NUCLEATED RED BLOOD CELLS: 0 /100 WBC
PH UA: 5 (ref 5–9)
PLATELET # BLD: 188 THOU/MM3 (ref 130–400)
PMV BLD AUTO: 11.3 FL (ref 9.4–12.4)
POTASSIUM SERPL-SCNC: 4.3 MEQ/L (ref 3.5–5.2)
PROTEIN UA: ABNORMAL MG/DL
RBC # BLD: 4.47 MILL/MM3 (ref 4.2–5.4)
RBC URINE: ABNORMAL /HPF
RENAL EPITHELIAL, UA: ABNORMAL
SEG NEUTROPHILS: 72.3 %
SEGMENTED NEUTROPHILS ABSOLUTE COUNT: 9.3 THOU/MM3 (ref 1.8–7.7)
SODIUM BLD-SCNC: 136 MEQ/L (ref 135–145)
SPECIFIC GRAVITY UA: 1.01 (ref 1–1.03)
T4 FREE: 1 NG/DL (ref 0.93–1.76)
TSH SERPL DL<=0.05 MIU/L-ACNC: 3.64 UIU/ML (ref 0.4–4.2)
UROBILINOGEN, URINE: 0.2 EU/DL (ref 0–1)
WBC # BLD: 12.8 THOU/MM3 (ref 4.8–10.8)
WBC UA: ABNORMAL /HPF
YEAST: ABNORMAL

## 2019-06-11 PROCEDURE — 36415 COLL VENOUS BLD VENIPUNCTURE: CPT

## 2019-06-11 PROCEDURE — 83735 ASSAY OF MAGNESIUM: CPT

## 2019-06-11 PROCEDURE — 2140000000 HC CCU INTERMEDIATE R&B

## 2019-06-11 PROCEDURE — 6360000002 HC RX W HCPCS: Performed by: INTERNAL MEDICINE

## 2019-06-11 PROCEDURE — 80048 BASIC METABOLIC PNL TOTAL CA: CPT

## 2019-06-11 PROCEDURE — 93005 ELECTROCARDIOGRAM TRACING: CPT | Performed by: INTERNAL MEDICINE

## 2019-06-11 PROCEDURE — 84443 ASSAY THYROID STIM HORMONE: CPT

## 2019-06-11 PROCEDURE — 84439 ASSAY OF FREE THYROXINE: CPT

## 2019-06-11 PROCEDURE — 82948 REAGENT STRIP/BLOOD GLUCOSE: CPT

## 2019-06-11 PROCEDURE — 87077 CULTURE AEROBIC IDENTIFY: CPT

## 2019-06-11 PROCEDURE — 2580000003 HC RX 258: Performed by: INTERNAL MEDICINE

## 2019-06-11 PROCEDURE — 2709999900 HC NON-CHARGEABLE SUPPLY

## 2019-06-11 PROCEDURE — 82330 ASSAY OF CALCIUM: CPT

## 2019-06-11 PROCEDURE — 85025 COMPLETE CBC W/AUTO DIFF WBC: CPT

## 2019-06-11 PROCEDURE — 87184 SC STD DISK METHOD PER PLATE: CPT

## 2019-06-11 PROCEDURE — 6370000000 HC RX 637 (ALT 250 FOR IP): Performed by: INTERNAL MEDICINE

## 2019-06-11 PROCEDURE — 94640 AIRWAY INHALATION TREATMENT: CPT

## 2019-06-11 PROCEDURE — 87186 SC STD MICRODIL/AGAR DIL: CPT

## 2019-06-11 PROCEDURE — 83036 HEMOGLOBIN GLYCOSYLATED A1C: CPT

## 2019-06-11 PROCEDURE — 99223 1ST HOSP IP/OBS HIGH 75: CPT | Performed by: INTERNAL MEDICINE

## 2019-06-11 PROCEDURE — 87086 URINE CULTURE/COLONY COUNT: CPT

## 2019-06-11 PROCEDURE — 81001 URINALYSIS AUTO W/SCOPE: CPT

## 2019-06-11 RX ORDER — ALBUTEROL SULFATE 90 UG/1
2 AEROSOL, METERED RESPIRATORY (INHALATION) EVERY 6 HOURS PRN
Status: DISCONTINUED | OUTPATIENT
Start: 2019-06-11 | End: 2019-06-18 | Stop reason: HOSPADM

## 2019-06-11 RX ORDER — GLIPIZIDE 5 MG/1
5 TABLET ORAL 2 TIMES DAILY
COMMUNITY

## 2019-06-11 RX ORDER — AMLODIPINE BESYLATE 10 MG/1
10 TABLET ORAL DAILY
Status: DISCONTINUED | OUTPATIENT
Start: 2019-06-12 | End: 2019-06-18 | Stop reason: HOSPADM

## 2019-06-11 RX ORDER — SODIUM CHLORIDE 9 MG/ML
INJECTION, SOLUTION INTRAVENOUS CONTINUOUS
Status: DISCONTINUED | OUTPATIENT
Start: 2019-06-11 | End: 2019-06-12

## 2019-06-11 RX ORDER — IBUPROFEN 800 MG/1
800 TABLET ORAL EVERY 6 HOURS PRN
Status: ON HOLD | COMMUNITY
End: 2019-06-18 | Stop reason: HOSPADM

## 2019-06-11 RX ORDER — DEXTROSE MONOHYDRATE 50 MG/ML
100 INJECTION, SOLUTION INTRAVENOUS PRN
Status: DISCONTINUED | OUTPATIENT
Start: 2019-06-11 | End: 2019-06-18 | Stop reason: HOSPADM

## 2019-06-11 RX ORDER — ASPIRIN 81 MG/1
81 TABLET ORAL DAILY
Status: DISCONTINUED | OUTPATIENT
Start: 2019-06-11 | End: 2019-06-18 | Stop reason: HOSPADM

## 2019-06-11 RX ORDER — SODIUM CHLORIDE 0.9 % (FLUSH) 0.9 %
10 SYRINGE (ML) INJECTION EVERY 12 HOURS SCHEDULED
Status: DISCONTINUED | OUTPATIENT
Start: 2019-06-11 | End: 2019-06-18 | Stop reason: HOSPADM

## 2019-06-11 RX ORDER — HYDROCHLOROTHIAZIDE 25 MG/1
50 TABLET ORAL DAILY
Status: DISCONTINUED | OUTPATIENT
Start: 2019-06-12 | End: 2019-06-12

## 2019-06-11 RX ORDER — SODIUM CHLORIDE 0.9 % (FLUSH) 0.9 %
10 SYRINGE (ML) INJECTION PRN
Status: DISCONTINUED | OUTPATIENT
Start: 2019-06-11 | End: 2019-06-18 | Stop reason: HOSPADM

## 2019-06-11 RX ORDER — POLYETHYLENE GLYCOL 3350 17 G/17G
17 POWDER, FOR SOLUTION ORAL DAILY
Status: DISCONTINUED | OUTPATIENT
Start: 2019-06-11 | End: 2019-06-18 | Stop reason: HOSPADM

## 2019-06-11 RX ORDER — BENZONATATE 100 MG/1
100 CAPSULE ORAL 3 TIMES DAILY PRN
COMMUNITY
End: 2020-07-27

## 2019-06-11 RX ORDER — PANTOPRAZOLE SODIUM 40 MG/1
40 TABLET, DELAYED RELEASE ORAL DAILY
Status: DISCONTINUED | OUTPATIENT
Start: 2019-06-11 | End: 2019-06-12

## 2019-06-11 RX ORDER — LEVOTHYROXINE SODIUM 0.05 MG/1
50 TABLET ORAL DAILY
Status: DISCONTINUED | OUTPATIENT
Start: 2019-06-12 | End: 2019-06-18 | Stop reason: HOSPADM

## 2019-06-11 RX ORDER — ACETAMINOPHEN 325 MG/1
650 TABLET ORAL EVERY 8 HOURS PRN
Status: DISCONTINUED | OUTPATIENT
Start: 2019-06-11 | End: 2019-06-18 | Stop reason: HOSPADM

## 2019-06-11 RX ORDER — ONDANSETRON 2 MG/ML
4 INJECTION INTRAMUSCULAR; INTRAVENOUS EVERY 6 HOURS PRN
Status: DISCONTINUED | OUTPATIENT
Start: 2019-06-11 | End: 2019-06-18 | Stop reason: HOSPADM

## 2019-06-11 RX ORDER — NICOTINE POLACRILEX 4 MG
15 LOZENGE BUCCAL PRN
Status: DISCONTINUED | OUTPATIENT
Start: 2019-06-11 | End: 2019-06-18 | Stop reason: HOSPADM

## 2019-06-11 RX ORDER — DEXTROSE MONOHYDRATE 25 G/50ML
12.5 INJECTION, SOLUTION INTRAVENOUS PRN
Status: DISCONTINUED | OUTPATIENT
Start: 2019-06-11 | End: 2019-06-18 | Stop reason: HOSPADM

## 2019-06-11 RX ADMIN — SERTRALINE 50 MG: 50 TABLET, FILM COATED ORAL at 22:28

## 2019-06-11 RX ADMIN — PANTOPRAZOLE SODIUM 40 MG: 40 TABLET, DELAYED RELEASE ORAL at 22:28

## 2019-06-11 RX ADMIN — Medication 2 PUFF: at 22:14

## 2019-06-11 RX ADMIN — Medication 10 ML: at 23:19

## 2019-06-11 RX ADMIN — ENOXAPARIN SODIUM 40 MG: 40 INJECTION SUBCUTANEOUS at 22:28

## 2019-06-11 RX ADMIN — ASPIRIN 81 MG: 81 TABLET ORAL at 22:28

## 2019-06-11 ASSESSMENT — PAIN DESCRIPTION - PAIN TYPE: TYPE: CHRONIC PAIN

## 2019-06-11 ASSESSMENT — PAIN DESCRIPTION - PROGRESSION: CLINICAL_PROGRESSION: NOT CHANGED

## 2019-06-11 ASSESSMENT — PAIN DESCRIPTION - ORIENTATION: ORIENTATION: MID

## 2019-06-11 ASSESSMENT — PAIN SCALES - GENERAL
PAINLEVEL_OUTOF10: 0
PAINLEVEL_OUTOF10: 7

## 2019-06-11 ASSESSMENT — PAIN DESCRIPTION - LOCATION: LOCATION: CHEST

## 2019-06-11 ASSESSMENT — PAIN - FUNCTIONAL ASSESSMENT: PAIN_FUNCTIONAL_ASSESSMENT: ACTIVITIES ARE NOT PREVENTED

## 2019-06-11 ASSESSMENT — PAIN DESCRIPTION - DESCRIPTORS: DESCRIPTORS: OTHER (COMMENT)

## 2019-06-11 ASSESSMENT — PAIN DESCRIPTION - FREQUENCY: FREQUENCY: CONTINUOUS

## 2019-06-11 ASSESSMENT — PAIN DESCRIPTION - ONSET: ONSET: ON-GOING

## 2019-06-12 ENCOUNTER — APPOINTMENT (OUTPATIENT)
Dept: ULTRASOUND IMAGING | Age: 64
DRG: 683 | End: 2019-06-12
Attending: INTERNAL MEDICINE
Payer: MEDICARE

## 2019-06-12 LAB
ANION GAP SERPL CALCULATED.3IONS-SCNC: 12 MEQ/L (ref 8–16)
AVERAGE GLUCOSE: 132 MG/DL (ref 70–126)
BASOPHILS # BLD: 0.6 %
BASOPHILS ABSOLUTE: 0.1 THOU/MM3 (ref 0–0.1)
BUN BLDV-MCNC: 68 MG/DL (ref 7–22)
CALCIUM SERPL-MCNC: 9.7 MG/DL (ref 8.5–10.5)
CHLORIDE BLD-SCNC: 105 MEQ/L (ref 98–111)
CO2: 18 MEQ/L (ref 23–33)
CREAT SERPL-MCNC: 3.2 MG/DL (ref 0.4–1.2)
CREATININE URINE: 66.8 MG/DL
EKG ATRIAL RATE: 41 BPM
EKG P AXIS: 72 DEGREES
EKG P-R INTERVAL: 196 MS
EKG Q-T INTERVAL: 520 MS
EKG QRS DURATION: 104 MS
EKG QTC CALCULATION (BAZETT): 429 MS
EKG R AXIS: -45 DEGREES
EKG T AXIS: 43 DEGREES
EKG VENTRICULAR RATE: 41 BPM
EOSINOPHIL # BLD: 2 %
EOSINOPHILS ABSOLUTE: 0.2 THOU/MM3 (ref 0–0.4)
ERYTHROCYTE [DISTWIDTH] IN BLOOD BY AUTOMATED COUNT: 14.1 % (ref 11.5–14.5)
ERYTHROCYTE [DISTWIDTH] IN BLOOD BY AUTOMATED COUNT: 47.9 FL (ref 35–45)
GFR SERPL CREATININE-BSD FRML MDRD: 14 ML/MIN/1.73M2
GLUCOSE BLD-MCNC: 111 MG/DL (ref 70–108)
GLUCOSE BLD-MCNC: 120 MG/DL (ref 70–108)
GLUCOSE BLD-MCNC: 134 MG/DL (ref 70–108)
GLUCOSE BLD-MCNC: 144 MG/DL (ref 70–108)
GLUCOSE BLD-MCNC: 150 MG/DL (ref 70–108)
HBA1C MFR BLD: 6.4 % (ref 4.4–6.4)
HCT VFR BLD CALC: 36.8 % (ref 37–47)
HEMOGLOBIN: 12.5 GM/DL (ref 12–16)
IMMATURE GRANS (ABS): 0.06 THOU/MM3 (ref 0–0.07)
IMMATURE GRANULOCYTES: 0.6 %
LYMPHOCYTES # BLD: 23.1 %
LYMPHOCYTES ABSOLUTE: 2.4 THOU/MM3 (ref 1–4.8)
MAGNESIUM: 2.6 MG/DL (ref 1.6–2.4)
MCH RBC QN AUTO: 31.3 PG (ref 26–33)
MCHC RBC AUTO-ENTMCNC: 34 GM/DL (ref 32.2–35.5)
MCV RBC AUTO: 92.2 FL (ref 81–99)
MONOCYTES # BLD: 5.5 %
MONOCYTES ABSOLUTE: 0.6 THOU/MM3 (ref 0.4–1.3)
NUCLEATED RED BLOOD CELLS: 0 /100 WBC
PLATELET # BLD: 149 THOU/MM3 (ref 130–400)
PMV BLD AUTO: 11.1 FL (ref 9.4–12.4)
POTASSIUM REFLEX MAGNESIUM: 4 MEQ/L (ref 3.5–5.2)
PROTEIN, URINE: 11.3 MG/DL
PROTEIN, URINE: 14.8 MG/DL
PTH INTACT: 12.9 PG/ML (ref 15–65)
RBC # BLD: 3.99 MILL/MM3 (ref 4.2–5.4)
SEG NEUTROPHILS: 68.2 %
SEGMENTED NEUTROPHILS ABSOLUTE COUNT: 7.1 THOU/MM3 (ref 1.8–7.7)
SODIUM BLD-SCNC: 135 MEQ/L (ref 135–145)
VITAMIN D 25-HYDROXY: 49 NG/ML (ref 30–100)
WBC # BLD: 10.4 THOU/MM3 (ref 4.8–10.8)

## 2019-06-12 PROCEDURE — 86160 COMPLEMENT ANTIGEN: CPT

## 2019-06-12 PROCEDURE — 6370000000 HC RX 637 (ALT 250 FOR IP): Performed by: INTERNAL MEDICINE

## 2019-06-12 PROCEDURE — 94640 AIRWAY INHALATION TREATMENT: CPT

## 2019-06-12 PROCEDURE — 94760 N-INVAS EAR/PLS OXIMETRY 1: CPT

## 2019-06-12 PROCEDURE — 6360000002 HC RX W HCPCS: Performed by: INTERNAL MEDICINE

## 2019-06-12 PROCEDURE — 2709999900 HC NON-CHARGEABLE SUPPLY

## 2019-06-12 PROCEDURE — 36415 COLL VENOUS BLD VENIPUNCTURE: CPT

## 2019-06-12 PROCEDURE — 86038 ANTINUCLEAR ANTIBODIES: CPT

## 2019-06-12 PROCEDURE — 83735 ASSAY OF MAGNESIUM: CPT

## 2019-06-12 PROCEDURE — 76770 US EXAM ABDO BACK WALL COMP: CPT

## 2019-06-12 PROCEDURE — 2580000003 HC RX 258: Performed by: INTERNAL MEDICINE

## 2019-06-12 PROCEDURE — 83970 ASSAY OF PARATHORMONE: CPT

## 2019-06-12 PROCEDURE — 99223 1ST HOSP IP/OBS HIGH 75: CPT | Performed by: NUCLEAR MEDICINE

## 2019-06-12 PROCEDURE — 80048 BASIC METABOLIC PNL TOTAL CA: CPT

## 2019-06-12 PROCEDURE — 2140000000 HC CCU INTERMEDIATE R&B

## 2019-06-12 PROCEDURE — 94660 CPAP INITIATION&MGMT: CPT

## 2019-06-12 PROCEDURE — 84156 ASSAY OF PROTEIN URINE: CPT

## 2019-06-12 PROCEDURE — 82570 ASSAY OF URINE CREATININE: CPT

## 2019-06-12 PROCEDURE — 99221 1ST HOSP IP/OBS SF/LOW 40: CPT | Performed by: INTERNAL MEDICINE

## 2019-06-12 PROCEDURE — 82306 VITAMIN D 25 HYDROXY: CPT

## 2019-06-12 PROCEDURE — 93010 ELECTROCARDIOGRAM REPORT: CPT | Performed by: NUCLEAR MEDICINE

## 2019-06-12 PROCEDURE — 85025 COMPLETE CBC W/AUTO DIFF WBC: CPT

## 2019-06-12 PROCEDURE — 82948 REAGENT STRIP/BLOOD GLUCOSE: CPT

## 2019-06-12 PROCEDURE — 99233 SBSQ HOSP IP/OBS HIGH 50: CPT | Performed by: INTERNAL MEDICINE

## 2019-06-12 RX ORDER — HYDRALAZINE HYDROCHLORIDE 20 MG/ML
5 INJECTION INTRAMUSCULAR; INTRAVENOUS EVERY 6 HOURS PRN
Status: DISCONTINUED | OUTPATIENT
Start: 2019-06-12 | End: 2019-06-18 | Stop reason: HOSPADM

## 2019-06-12 RX ORDER — SODIUM BICARBONATE 650 MG/1
1300 TABLET ORAL 2 TIMES DAILY
Status: DISCONTINUED | OUTPATIENT
Start: 2019-06-12 | End: 2019-06-16

## 2019-06-12 RX ORDER — FAMOTIDINE 20 MG/1
20 TABLET, FILM COATED ORAL DAILY
Status: DISCONTINUED | OUTPATIENT
Start: 2019-06-12 | End: 2019-06-18 | Stop reason: HOSPADM

## 2019-06-12 RX ORDER — SODIUM CHLORIDE, SODIUM LACTATE, POTASSIUM CHLORIDE, CALCIUM CHLORIDE 600; 310; 30; 20 MG/100ML; MG/100ML; MG/100ML; MG/100ML
INJECTION, SOLUTION INTRAVENOUS CONTINUOUS
Status: DISCONTINUED | OUTPATIENT
Start: 2019-06-12 | End: 2019-06-18 | Stop reason: HOSPADM

## 2019-06-12 RX ADMIN — FAMOTIDINE 20 MG: 20 TABLET, FILM COATED ORAL at 09:20

## 2019-06-12 RX ADMIN — SODIUM BICARBONATE 1300 MG: 650 TABLET ORAL at 19:25

## 2019-06-12 RX ADMIN — INSULIN LISPRO 1 UNITS: 100 INJECTION, SOLUTION INTRAVENOUS; SUBCUTANEOUS at 16:06

## 2019-06-12 RX ADMIN — Medication 2 PUFF: at 22:11

## 2019-06-12 RX ADMIN — AMLODIPINE BESYLATE 10 MG: 10 TABLET ORAL at 09:21

## 2019-06-12 RX ADMIN — ENOXAPARIN SODIUM 40 MG: 40 INJECTION SUBCUTANEOUS at 22:20

## 2019-06-12 RX ADMIN — SODIUM CHLORIDE: 9 INJECTION, SOLUTION INTRAVENOUS at 00:58

## 2019-06-12 RX ADMIN — SODIUM CHLORIDE: 9 INJECTION, SOLUTION INTRAVENOUS at 11:56

## 2019-06-12 RX ADMIN — SERTRALINE 50 MG: 50 TABLET, FILM COATED ORAL at 19:26

## 2019-06-12 RX ADMIN — INSULIN LISPRO 1 UNITS: 100 INJECTION, SOLUTION INTRAVENOUS; SUBCUTANEOUS at 11:54

## 2019-06-12 RX ADMIN — ASPIRIN 81 MG: 81 TABLET ORAL at 19:25

## 2019-06-12 RX ADMIN — SODIUM CHLORIDE, POTASSIUM CHLORIDE, SODIUM LACTATE AND CALCIUM CHLORIDE: 600; 310; 30; 20 INJECTION, SOLUTION INTRAVENOUS at 16:08

## 2019-06-12 RX ADMIN — LEVOTHYROXINE SODIUM 50 MCG: 50 TABLET ORAL at 06:51

## 2019-06-12 RX ADMIN — Medication 2 PUFF: at 07:40

## 2019-06-12 ASSESSMENT — PAIN SCALES - GENERAL
PAINLEVEL_OUTOF10: 0
PAINLEVEL_OUTOF10: 0

## 2019-06-12 NOTE — PROGRESS NOTES
Hospitalist Progress Note      Patient:  Ann Fish      Unit/Bed:3B-35/035-A    YOB: 1955    MRN: [id number]       Acct: [de-identified]     PCP: [address]    Date of Admission: 6/11/2019    Assessment/Plan:    · Acute kidney injury-likely secondary to the use of NSAID, diuretic also patient is bradycardic, which can lead to renal hypoperfusion-nephrology following IV fluids ordered-kidney ultrasound hold all nephrotoxic agent-BMP a.m. · Bradycardia-patient is not on beta-blockers however uses clonidine at home-however patient has obstructive sleep apnea and is noncompliant with her CPAP-we will order CPAP here    · Essential hypertension-holding clonidine, secondary to bradycardia -we will add hydralazine as needed for systolic blood pressures above 160    · Acute metabolic acidosis likely secondary to acute kidney injury-on IV fluid-and on bicarbonate tablet twice daily    · Type 2 diabetes mellitus with questionable chronic kidney disease related to diabetic nephrosclerosis-however her previous creatinine is normal-continue home medication for diabetes- -     · History of depression on Zoloft continue  · History of GERD was on Protonix at home however held secondary to acute kidney injury added famotidine instead  · Morbid obesity-lifestyle modification  · History of mild asthma-on Dulera at home continue  · History of hypothyroidism on Synthyroid-continue-TSH and free T4 checked in its normal        Expected discharge date:  tbd    Disposition:    [x] Home       [] TCU       [] Rehab       [] Psych       [] SNF       [] [facility]       [] Other-    Chief Complaint:   Low heart rate    Initial H and P:-  ED  75 y.o. female who presented to [address] [place] [address] with \"low heart rate.  The patient was in an outpatient center receiving a magnesium infusion when it was noted when checking her vital signs, but her heart rate was low in the high 30s. The patient denies a history of this. She states she has no history of heart disease. She states she has felt fatigued however denies dizziness, near syncope, syncope, chest pain, or palpitations. She states she receives frequent magnesium infusions for hypomagnesemia\"-per  note and confirmed by myself      Subjective (past 24 hours):     Feels better  Heart rate around in the 40s to 50s asymptomatic  No chest pain no shortness of breath, no nausea no vomiting diarrhea   Patient uses CPAP at home however is noncompliant     Past medical history, family history and social history reviewed again and is unchanged since admission. Medications:  Reviewed    Infusion Medications    lactated ringers 100 mL/hr at 06/12/19 1608    dextrose       Scheduled Medications    famotidine  20 mg Oral Daily    sodium bicarbonate  1,300 mg Oral BID    amLODIPine  10 mg Oral Daily    aspirin  81 mg Oral Daily    mometasone-formoterol  2 puff Inhalation BID    levothyroxine  50 mcg Oral Daily    sertraline  50 mg Oral Daily    sodium chloride flush  10 mL Intravenous 2 times per day    enoxaparin  40 mg Subcutaneous Q24H    polyethylene glycol  17 g Oral Daily    insulin lispro  0-6 Units Subcutaneous TID WC    insulin lispro  0-3 Units Subcutaneous Nightly     PRN Meds: hydrALAZINE, acetaminophen, albuterol sulfate HFA, sodium chloride flush, ondansetron, glucose, dextrose, glucagon (rDNA), dextrose      Intake/Output Summary (Last 24 hours) at 6/12/2019 1611  Last data filed at 6/12/2019 1420  Gross per 24 hour   Intake 2377.46 ml   Output 3075 ml   Net -697.54 ml       Diet:  DIET CARB CONTROL; Exam:  /63   Pulse 54   Temp 98.1 °F (36.7 °C) (Oral)   Resp 18   Ht 5' 4\" (1.626 m)   Wt (!) 316 lb 3.2 oz (143.4 kg)   SpO2 95%   BMI 54.28 kg/m²     General appearance: No apparent distress, appears stated age and cooperative.   HEENT: Pupils made with a urinalysis. **This report has been created using voice recognition software. It may contain minor errors which are inherent in voice recognition technology. **      Final report electronically signed by Dr. Cathleen Hood on 6/12/2019 12:55 PM        No results found.           Electronically signed by Rula Rojas MD on 6/12/2019 at 4:11 PM

## 2019-06-12 NOTE — CONSULTS
 Sleep apnea     Type 2 diabetes mellitus without complication (HCC)        Past Surgical History:   Procedure Laterality Date    APPENDECTOMY      CATARACT REMOVAL      CHOLECYSTECTOMY         Family History   Problem Relation Age of Onset    Diabetes Mother     Heart Disease Mother     Diabetes Father     Diabetes Brother         reports that she has never smoked. She has never used smokeless tobacco. She reports that she does not drink alcohol or use drugs. Allergies:  Lisinopril    Current Medications:      acetaminophen (TYLENOL) tablet 650 mg Q8H PRN   albuterol sulfate  (90 Base) MCG/ACT inhaler 2 puff Q6H PRN   amLODIPine (NORVASC) tablet 10 mg Daily   aspirin EC tablet 81 mg Daily   mometasone-formoterol (DULERA) 200-5 MCG/ACT inhaler 2 puff BID   levothyroxine (SYNTHROID) tablet 50 mcg Daily   pantoprazole (PROTONIX) tablet 40 mg Daily   sertraline (ZOLOFT) tablet 50 mg Daily   hydrochlorothiazide (HYDRODIURIL) tablet 50 mg Daily   sodium chloride flush 0.9 % injection 10 mL 2 times per day   sodium chloride flush 0.9 % injection 10 mL PRN   ondansetron (ZOFRAN) injection 4 mg Q6H PRN   enoxaparin (LOVENOX) injection 40 mg Q24H   glucose (GLUTOSE) 40 % oral gel 15 g PRN   dextrose 50 % IV solution PRN   glucagon (rDNA) injection 1 mg PRN   dextrose 5 % solution PRN   0.9 % sodium chloride infusion Continuous   polyethylene glycol (GLYCOLAX) packet 17 g Daily   insulin lispro (HUMALOG) injection vial 0-6 Units TID WC   insulin lispro (HUMALOG) injection vial 0-3 Units Nightly       Review of Systems:   Pertinent positives stated above in HPI. All other systems were reviewed and were negative.   ROS:Constitutional: negative  Eyes: negative  Ears, nose, mouth, throat, and face: negative  Respiratory: negative  Cardiovascular: positive for fatigue and lower extremity edema  Gastrointestinal: negative  Genitourinary:negative  Integument/breast: negative  Hematologic/lymphatic: negative  Musculoskeletal:positive for arthralgias and stiff joints  Neurological: negative  Behavioral/Psych: negative  Endocrine: negative  Allergic/Immunologic: negative    Physical exam:   Constitutional: Well-developed elderly lady in no distress. Vitals:   Vitals:    06/12/19 1201   BP: (!) 151/72   Pulse: (!) 49   Resp:    Temp:    SpO2: 95%       Skin: no rash, turgor is normal.  Heent: Pupils are reactive to light and accommodation. Throat is clear. Oral mucosa is moist.  Neck: no bruits or jvd noted  Cardiovascular:  S1, S2 without murmur or rubs. Respiratory: Clear to auscultation. Abdomen: Soft. Good bowel sounds. No palpable mass. Nontender. Ext: 1+ lower extremity mavis ma  Psychiatric: mood and affect appropriate  Musculoskeletal:  Rom, muscular strength intact  CNS very awake and very alert.:  Very oriented. Normal speech. Good motor strength. No focal deficit. Data:   Labs:  Lab Results   Component Value Date     06/12/2019     06/11/2019     04/17/2018    K 4.0 06/12/2019    K 4.3 06/11/2019    K 5.1 04/17/2018     06/12/2019    CO2 18 (L) 06/12/2019    CO2 15 (L) 06/11/2019    CO2 30 04/17/2018    CREATININE 3.2 (HH) 06/12/2019    CREATININE 3.4 (HH) 06/11/2019    CREATININE 0.9 04/17/2018    BUN 68 (H) 06/12/2019    BUN 69 (H) 06/11/2019    BUN 41 (H) 04/17/2018    GLUCOSE 120 (H) 06/12/2019    GLUCOSE 116 (H) 06/11/2019    GLUCOSE 192 (H) 04/17/2018    WBC 10.4 06/12/2019    WBC 12.8 (H) 06/11/2019    WBC 13.7 (H) 04/17/2018    HGB 12.5 06/12/2019    HGB 14.0 06/11/2019    HGB 13.1 04/17/2018    HCT 36.8 (L) 06/12/2019    HCT 42.2 06/11/2019    HCT 40.0 04/17/2018    MCV 92.2 06/12/2019     06/12/2019     {Labs reviewed    Imaging:  CXR results:         Thank you Dr. Sirisha Torre for allowing us to participate in care of Gloprecious Bird       Electronically signed by Linus Kim MD on 6/12/2019 at 6:46 AM

## 2019-06-12 NOTE — CONSULTS
obtain an echocardiogram.  5.  Will consider ischemia workup at some point once her kidney  situation is fully investigated and stabilized. Thank you for allowing me to participate in the care of this patient.         Tono Sullivan M.D.    D: 06/12/2019 15:42:35       T: 06/12/2019 17:02:53     [email or web address]  Job#: [id number]     Doc#: [id number]    CC:

## 2019-06-12 NOTE — H&P
History & Physical        Patient:  Amy Kim  YOB: 1955    MRN: [id number]     Acct: [de-identified]    PCP: [address]    Date of Admission: 6/11/2019    Date of Service: Pt seen/examined on 6/11/2019 and Admitted to Inpatient with expected LOS greater than two midnights due to medical therapy. Chief Complaint:  No chief complaint on file. History Of Present Illness:      75 y.o. female who presented to [facility] with \"low heart rate. The patient was in an outpatient center receiving a magnesium infusion when it was noted when checking her vital signs, but her heart rate was low in the high 30s. The patient denies a history of this. She states she has no history of heart disease. She states she has felt fatigued however denies dizziness, near syncope, syncope, chest pain, or palpitations. She states she receives frequent magnesium infusions for hypomagnesemia\"-per  note and confirmed by myself    Addendum:             Past Medical History:          Diagnosis Date    Asthma     Blood circulation, collateral     COPD (chronic obstructive pulmonary disease) (HCC)     COPD (chronic obstructive pulmonary disease) (HCC)     Fibromyalgia     Hyperlipidemia     Hypertension     Hypothyroidism     Pickwickian syndrome ([facility] [facility]     Psychiatric problem     Anxiety    Restless leg     Sleep apnea     Type 2 diabetes mellitus without complication ([facility])        Past Surgical History:          Procedure Laterality Date    APPENDECTOMY      CATARACT REMOVAL      CHOLECYSTECTOMY         Medications Prior to Admission:      Prior to Admission medications    Medication Sig Start Date End Date Taking?  Authorizing Provider   metFORMIN (GLUCOPHAGE) 1000 MG tablet Take 2,000 mg by mouth 2 times daily (with meals)   Yes Historical Provider, MD   amLODIPine (NORVASC) 10 MG tablet Take 1 tablet by mouth daily 4/22/18  Yes Anjali Colorado MD hydrochlorothiazide (HYDRODIURIL) 50 MG tablet Take 1 tablet by mouth daily 4/22/18  Yes [address] [place], [place] [address]so Mathews MD   cloNIDine (CATAPRES) 0.1 MG tablet Take 0.1 mg by mouth 2 times daily   Yes Historical Provider, MD   levothyroxine (SYNTHROID) 50 MCG tablet Take 1 tablet by mouth Daily 12/7/17  Yes Suad Nichols MD   pantoprazole (PROTONIX) 40 MG tablet Take 40 mg by mouth daily   Yes Historical Provider, MD   aspirin 81 MG tablet Take 81 mg by mouth daily   Yes Historical Provider, MD   fluticasone-salmeterol (ADVAIR) 250-50 MCG/DOSE AEPB Inhale 1 puff into the lungs every 12 hours   Yes Historical Provider, MD   sertraline (ZOLOFT) 50 MG tablet Take 50 mg by mouth daily   Yes Historical Provider, MD   glipiZIDE (GLUCOTROL) 5 MG tablet Take 5 mg by mouth 2 times daily as needed Only takes if sugar is >150    Historical Provider, MD   ibuprofen (ADVIL;MOTRIN) 800 MG tablet Take 800 mg by mouth every 6 hours as needed for Pain    Historical Provider, MD   benzonatate (TESSALON) 100 MG capsule Take 100 mg by mouth 3 times daily as needed for Cough    Historical Provider, MD   albuterol sulfate  (90 Base) MCG/ACT inhaler Inhale 2 puffs into the lungs every 6 hours as needed for Wheezing    Historical Provider, MD   acyclovir (ZOVIRAX) 5 % ointment Apply topically every 3 hours Apply topically every 3 hours. Historical Provider, MD   acetaminophen (TYLENOL) 325 MG tablet Take 650 mg by mouth every 8 hours as needed     Historical Provider, MD       Allergies:  Lisinopril    Social History:      The patient currently lives spouse    TOBACCO:   reports that she has never smoked. She has never used smokeless tobacco.  ETOH:   reports that she does not drink alcohol. Family History:      Reviewed in detail and negative for DM, CAD, Cancer, CVA.  Positive as follows:        Problem Relation Age of Onset    Diabetes Mother     Heart Disease Mother     Diabetes Father     Diabetes Brother        Diet:  DIET CARB CONTROL;    REVIEW OF SYSTEMS:   Pertinent positives as noted in the HPI. All other systems reviewed and negative. PHYSICAL EXAM:    BP (!) 143/65   Pulse (!) 40   Temp 97.6 °F (36.4 °C) (Oral)   Resp 20   Ht 5' 4\" (1.626 m)   Wt (!) 317 lb 8 oz (144 kg)   SpO2 96%   BMI 54.50 kg/m²     General appearance:  No apparent distress, appears stated age and cooperative. HEENT:  Normal cephalic, atraumatic without obvious deformity. Pupils equal, round, and reactive to light. Extra ocular muscles intact. Conjunctivae/corneas clear. Neck: Supple, with full range of motion. no jugular venous distention. Trachea midline. no carotid bruits  Respiratory:  Normal respiratory effort. Clear to auscultation, bilaterally without Rales/Wheezes/Rhonchi. Breath sounds equal bilaterally  Cardiovascular:  bradycarddia rate and rhythm with normal S1/S2 without murmurs, rubs or gallops. PMI non displaced  Abdomen: Soft, non-tender, non-distended with normal bowel sounds. No guarding, rebound. OBESE  Musculoskeletal:  No clubbing, cyanosis or edema bilaterally. Full range of motion without deformity. Skin: Skin color, texture, turgor normal.  No rashes or lesions, or suspicious lesions. Neurologic:  Neurovascularly intact without any focal sensory/motor deficits. Cranial nerves: II-XII intact, grossly non-focal.  Psychiatric:  Alert and oriented, thought content appropriate, normal insight  Capillary Refill: Brisk,< 2 seconds   Peripheral Pulses: +2 palpable, equal bilaterally upper and lower extremities  Lymphatics: no lymphadenopathy      Labs:     No results for input(s): WBC, HGB, HCT, PLT in the last 72 hours. No results for input(s): NA, K, CL, CO2, BUN, CREATININE, CALCIUM, PHOS in the last 72 hours. Invalid input(s): MAGNES  No results for input(s): AST, ALT, BILIDIR, BILITOT, ALKPHOS in the last 72 hours. No results for input(s): INR in the last 72 hours.   No results for input(s): Abe Dunlap in the last 72 hours. Urinalysis:    No results found for: Xavier Budds, BACTERIA, RBCUA, BLOODU, [address], [address] [place] [address]    Radiology:     CXR: I have reviewed the CXR with the following interpretation:   EKG:  I have reviewed the EKG with the following interpretation: pending    No results found. DVT prophylaxis: [x] Lovenox                                 [] SCDs                                 [] SQ Heparin                                 [] Encourage ambulation           [] Already on Anticoagulation    Code Status: Full Code      PT/OT Eval Status:     Disposition:    [x] Home       [] TCU       [] Rehab       [] Psych       [] SNF       [] [facility]       [] Other-    ASSESSMENT:    [address] Problems    Diagnosis Date Noted    Bacteriuria [R82.71] 06/11/2019    Bradycardia [R00.1] 04/17/2018    Morbid obesity ([facility] [E66.01] 04/17/2018    Obesity hypoventilation syndrome ([facility] [E66.2]     Type 2 diabetes mellitus without complication, without long-term current use of insulin ([facility] [address] [E11.9] 09/27/2017    Acquired hypothyroidism [E03.9] 09/27/2017       PLAN:    1. ivf  2. Hold nephrotoxic agents  3. Hold clonidine  4. Check tsh/free t4  5. ivx abx  6. Cardiology consulted  7. Renal consulted  8. Thank you Dmitry Aragon for the opportunity to be involved in this patient's care.     Electronically signed by Altagracia Dow DO on 6/11/2019 at 8:40 PM

## 2019-06-12 NOTE — CARE COORDINATION
6/12/19, 7:50 AM      Adrian Sotelo       Admitted from: Direct Admit from [facility] ED 6/11/2019/ [address]  [address] day: 1   Location: [address] [place] [address]- Reason for admit: Bradycardia [R00.1] Status: IP  Admit order signed?: yes  PMH:  has a past medical history of Asthma, Blood circulation, collateral, COPD (chronic obstructive pulmonary disease) ([facility] [address] COPD (chronic obstructive pulmonary disease) ([facility] [address] Fibromyalgia, Hyperlipidemia, Hypertension, Hypothyroidism, Pickwickian syndrome ([facility] [address] Psychiatric problem, Restless leg, Sleep apnea, and Type 2 diabetes mellitus without complication ([facility] [address] Procedure: None  Pertinent abnormal Imaging:None  Medications:  Scheduled Meds:   famotidine  20 mg Oral Daily    amLODIPine  10 mg Oral Daily    aspirin  81 mg Oral Daily    mometasone-formoterol  2 puff Inhalation BID    levothyroxine  50 mcg Oral Daily    sertraline  50 mg Oral Daily    sodium chloride flush  10 mL Intravenous 2 times per day    enoxaparin  40 mg Subcutaneous Q24H    polyethylene glycol  17 g Oral Daily    insulin lispro  0-6 Units Subcutaneous TID WC    insulin lispro  0-3 Units Subcutaneous Nightly     Continuous Infusions:   dextrose      sodium chloride 100 mL/hr at 06/12/19 0058      Pertinent Info/Orders/Treatment Plan:  Pt admitted with bradycardia, heart rate noted in the 30's. Pt was transferred from [facility] ED. Pt was receiving iv infusion of Magnesium in the OP setting at [facility], had low HR and was sent to ED in [facility]. Was then transferred to [facility]. HR was found to be in the 30's. IVF started. Lovenox. BUN 69 and 68, Creatinine 3.4 and 3.2. Consulting Cardiology and Nephrology. Diet: DIET CARB CONTROL;   Smoking status:  reports that she has never smoked.  She has never used smokeless tobacco.   PCP: Lisa Tinoco  Readmission: No  Readmission Risk Score: 8%    Discharge Planning  Current Residence:  Private Residence  Living Arrangements:  Alone   Support Systems: Children  Current Services PTA:     Potential Assistance Needed:  N/A  Potential Assistance Purchasing Medications:  No  Does patient want to participate in local refill/ meds to beds program?  No  Type of Home Care Services:  None  Patient expects to be discharged to:  home  Expected Discharge date:  06/14/19  Follow Up Appointment: Best Day/ Time: Wednesday PM    Discharge Plan: Spoke with pt. She lives at home alone. She drives and is able to get to her appointments. She has a standard walker, a Rolator and a wheelchair at home. Pt reports that she does not want home health, Lolita Flores is fine on her own\". Will continue to follow for needs.       Evaluation: no

## 2019-06-12 NOTE — PLAN OF CARE
Problem: RESPIRATORY  Goal: STG - patient can administer MDI's  Outcome: Ongoing  Note:   Continue with home regimen

## 2019-06-12 NOTE — PROGRESS NOTES
Dr. Gomez Sale in to see patient. I informed her of her high blood pressures at noon time. She stated to order 5mg of hydralazine q 6hrs for SBP>160. She also stated to order cpap since the patient has one at home with a pressure of 12. Orders placed in Epic.

## 2019-06-13 LAB
ANION GAP SERPL CALCULATED.3IONS-SCNC: 12 MEQ/L (ref 8–16)
BUN BLDV-MCNC: 55 MG/DL (ref 7–22)
CALCIUM SERPL-MCNC: 9.2 MG/DL (ref 8.5–10.5)
CHLORIDE BLD-SCNC: 108 MEQ/L (ref 98–111)
CO2: 19 MEQ/L (ref 23–33)
CREAT SERPL-MCNC: 2.7 MG/DL (ref 0.4–1.2)
GFR SERPL CREATININE-BSD FRML MDRD: 18 ML/MIN/1.73M2
GLUCOSE BLD-MCNC: 121 MG/DL (ref 70–108)
GLUCOSE BLD-MCNC: 124 MG/DL (ref 70–108)
GLUCOSE BLD-MCNC: 125 MG/DL (ref 70–108)
GLUCOSE BLD-MCNC: 127 MG/DL (ref 70–108)
GLUCOSE BLD-MCNC: 154 MG/DL (ref 70–108)
LV EF: 55 %
LVEF MODALITY: NORMAL
ORGANISM: ABNORMAL
POTASSIUM SERPL-SCNC: 4.6 MEQ/L (ref 3.5–5.2)
REASON FOR REJECTION: NORMAL
REJECTED TEST: NORMAL
SODIUM BLD-SCNC: 139 MEQ/L (ref 135–145)
URINE CULTURE, ROUTINE: ABNORMAL

## 2019-06-13 PROCEDURE — 94640 AIRWAY INHALATION TREATMENT: CPT

## 2019-06-13 PROCEDURE — 6360000002 HC RX W HCPCS: Performed by: INTERNAL MEDICINE

## 2019-06-13 PROCEDURE — 99232 SBSQ HOSP IP/OBS MODERATE 35: CPT | Performed by: INTERNAL MEDICINE

## 2019-06-13 PROCEDURE — 2709999900 HC NON-CHARGEABLE SUPPLY

## 2019-06-13 PROCEDURE — 80048 BASIC METABOLIC PNL TOTAL CA: CPT

## 2019-06-13 PROCEDURE — 6370000000 HC RX 637 (ALT 250 FOR IP): Performed by: INTERNAL MEDICINE

## 2019-06-13 PROCEDURE — 36415 COLL VENOUS BLD VENIPUNCTURE: CPT

## 2019-06-13 PROCEDURE — 82948 REAGENT STRIP/BLOOD GLUCOSE: CPT

## 2019-06-13 PROCEDURE — 2140000000 HC CCU INTERMEDIATE R&B

## 2019-06-13 PROCEDURE — 93306 TTE W/DOPPLER COMPLETE: CPT

## 2019-06-13 PROCEDURE — 2580000003 HC RX 258: Performed by: INTERNAL MEDICINE

## 2019-06-13 PROCEDURE — 99232 SBSQ HOSP IP/OBS MODERATE 35: CPT | Performed by: PHYSICIAN ASSISTANT

## 2019-06-13 RX ORDER — AMOXICILLIN AND CLAVULANATE POTASSIUM 500; 125 MG/1; MG/1
1 TABLET, FILM COATED ORAL EVERY 12 HOURS SCHEDULED
Status: COMPLETED | OUTPATIENT
Start: 2019-06-13 | End: 2019-06-17

## 2019-06-13 RX ADMIN — SODIUM BICARBONATE 1300 MG: 650 TABLET ORAL at 20:14

## 2019-06-13 RX ADMIN — SODIUM BICARBONATE 1300 MG: 650 TABLET ORAL at 09:26

## 2019-06-13 RX ADMIN — AMLODIPINE BESYLATE 10 MG: 10 TABLET ORAL at 09:27

## 2019-06-13 RX ADMIN — SERTRALINE 50 MG: 50 TABLET, FILM COATED ORAL at 20:14

## 2019-06-13 RX ADMIN — Medication 2 PUFF: at 20:54

## 2019-06-13 RX ADMIN — AMOXICILLIN AND CLAVULANATE POTASSIUM 1 TABLET: 500; 125 TABLET, FILM COATED ORAL at 20:14

## 2019-06-13 RX ADMIN — AMOXICILLIN AND CLAVULANATE POTASSIUM 1 TABLET: 500; 125 TABLET, FILM COATED ORAL at 13:25

## 2019-06-13 RX ADMIN — SODIUM CHLORIDE, POTASSIUM CHLORIDE, SODIUM LACTATE AND CALCIUM CHLORIDE: 600; 310; 30; 20 INJECTION, SOLUTION INTRAVENOUS at 13:28

## 2019-06-13 RX ADMIN — Medication 2 PUFF: at 10:04

## 2019-06-13 RX ADMIN — LEVOTHYROXINE SODIUM 50 MCG: 50 TABLET ORAL at 06:45

## 2019-06-13 RX ADMIN — FAMOTIDINE 20 MG: 20 TABLET, FILM COATED ORAL at 09:27

## 2019-06-13 RX ADMIN — ASPIRIN 81 MG: 81 TABLET ORAL at 20:14

## 2019-06-13 RX ADMIN — ENOXAPARIN SODIUM 40 MG: 40 INJECTION SUBCUTANEOUS at 20:15

## 2019-06-13 ASSESSMENT — PAIN SCALES - GENERAL
PAINLEVEL_OUTOF10: 0

## 2019-06-13 NOTE — PLAN OF CARE
Problem: RESPIRATORY  Goal: STG - patient can administer MDI's  Outcome: Met This Shift  Note:   Patient can administer MDI properly and without instruction

## 2019-06-13 NOTE — PROGRESS NOTES
Hospitalist Progress Note      Patient:  Fabrice Harris      Unit/Bed:3B-35/035-A    YOB: 1955    MRN: [id number]       Acct: [de-identified]     PCP: [address]    Date of Admission: 6/11/2019    Assessment/Plan:    · Acute kidney injury-likely secondary to the use of NSAID, diuretic also patient is bradycardic, which can lead to renal hypoperfusion-improving levels -nephrology following IV fluids ordered-kidney ultrasound changes of chronic kidney disease- hold all nephrotoxic agent-BMP a.m. · Bradycardia-patient is not on beta-blockers however uses clonidine at home-however patient has obstructive sleep apnea and is noncompliant with her CPAP-we will order CPAP here-issues with major noncompliance-problem follow-up as outpatient    · Essential hypertension-holding clonidine, secondary to bradycardia -we will add hydralazine as needed for systolic blood pressures above 160  · UTI E. coli growing -added Augmentin for 5 days    · Acute metabolic acidosis likely secondary to acute kidney injury-on IV fluid-and on bicarbonate tablet twice daily    · Type 2 diabetes mellitus with questionable chronic kidney disease related to diabetic nephrosclerosis-however her previous creatinine is normal-continue home medication for diabetes- -     · History of depression on Zoloft continue  · History of GERD was on Protonix at home however held secondary to acute kidney injury added famotidine instead  · Morbid obesity-lifestyle modification  · History of mild asthma-on Dulera at home continue  · History of hypothyroidism on Synthyroid-continue-TSH and free T4 checked in its normal        Expected discharge date:  tbd    Disposition:    [x] Home       [] TCU       [] Rehab       [] Psych       [] SNF       [] [facility]       [] Other-    Chief Complaint:   Low heart rate    Initial H and P:-  ED  75 y.o. female who presented to [address] [place] [address] with \"low heart rate. The patient was in an outpatient center receiving a magnesium infusion when it was noted when checking her vital signs, but her heart rate was low in the high 30s. The patient denies a history of this. She states she has no history of heart disease. She states she has felt fatigued however denies dizziness, near syncope, syncope, chest pain, or palpitations. She states she receives frequent magnesium infusions for hypomagnesemia\"-per  note and confirmed by myself      Subjective (past 24 hours):     Feels better  Wore BiPAP last night however patient is not really happy about it  Spoke about untreated sleep apnea and the implications  Unsure how much she knows or she understands      Past medical history, family history and social history reviewed again and is unchanged since admission. Medications:  Reviewed    Infusion Medications    lactated ringers 100 mL/hr at 06/13/19 1328    dextrose       Scheduled Medications    amoxicillin-clavulanate  1 tablet Oral 2 times per day    famotidine  20 mg Oral Daily    sodium bicarbonate  1,300 mg Oral BID    amLODIPine  10 mg Oral Daily    aspirin  81 mg Oral Daily    mometasone-formoterol  2 puff Inhalation BID    levothyroxine  50 mcg Oral Daily    sertraline  50 mg Oral Daily    sodium chloride flush  10 mL Intravenous 2 times per day    enoxaparin  40 mg Subcutaneous Q24H    polyethylene glycol  17 g Oral Daily    insulin lispro  0-6 Units Subcutaneous TID WC    insulin lispro  0-3 Units Subcutaneous Nightly     PRN Meds: hydrALAZINE, acetaminophen, albuterol sulfate HFA, sodium chloride flush, ondansetron, glucose, dextrose, glucagon (rDNA), dextrose      Intake/Output Summary (Last 24 hours) at 6/13/2019 1707  Last data filed at 6/13/2019 1330  Gross per 24 hour   Intake 3371.14 ml   Output 3325 ml   Net 46.14 ml       Diet:  DIET CARB CONTROL;     Exam:  BP (!) 145/67   Pulse (!) 49 Temp 97.7 °F (36.5 °C) (Oral)   Resp 18   Ht 5' 4\" (1.626 m)   Wt (!) 317 lb 3.2 oz (143.9 kg)   SpO2 96%   BMI 54.45 kg/m²     General appearance: No apparent distress, appears stated age and cooperative. HEENT: Pupils equal, round, and reactive to light. Conjunctivae/corneas clear. Neck: Supple, with full range of motion. No jugular venous distention. Trachea midline. Respiratory:  Normal respiratory effort. Clear to auscultation, bilaterally without Rales/Wheezes/Rhonchi. Cardiovascular: Regular rate and rhythm with normal S1/S2 without murmurs, rubs or gallops. Abdomen: Soft, non-tender, non-distended with normal bowel sounds. Musculoskeletal: passive and active ROM x 4 extremities. Skin: Skin color, texture, turgor normal.  No rashes or lesions. Neurologic:  Neurovascularly intact without any focal sensory/motor deficits. Cranial nerves: II-XII intact, grossly non-focal.  Psychiatric: Alert and oriented, thought content appropriate, normal insight  Capillary Refill: Brisk,< 3 seconds   Peripheral Pulses: +2 palpable, equal bilaterally       Labs:   Recent Labs     06/11/19  2119 06/12/19  0400   WBC 12.8* 10.4   HGB 14.0 12.5   HCT 42.2 36.8*    149     Recent Labs     06/11/19  2108 06/12/19  0400 06/13/19  1231    135 139   K 4.3 4.0 4.6    105 108   CO2 15* 18* 19*   BUN 69* 68* 55*   CREATININE 3.4* 3.2* 2.7*   CALCIUM 10.2 9.7 9.2     No results for input(s): AST, ALT, BILIDIR, BILITOT, ALKPHOS in the last 72 hours. No results for input(s): INR in the last 72 hours. No results for input(s): Jeannette Ryne in the last 72 hours. Microbiology:      Urinalysis:      Lab Results   Component Value Date    NITRU NEGATIVE 06/11/2019    WBCUA 5-10 06/11/2019    BACTERIA MANY 06/11/2019    RBCUA 0-2 06/11/2019    BLOODU NEGATIVE 06/11/2019    SPECGRAV 1.012 06/11/2019       Radiology:  US RENAL COMPLETE   Final Result   1.  There is mild elevation of the left renal resistive index measuring 0.73. The sonographic appearance of the kidneys is otherwise within normal limits. 2. The urinary bladder is underdistended and contains low-level echoes. The urinary bladder is otherwise not adequately visualized or evaluated. Correlation should be made with a urinalysis. **This report has been created using voice recognition software. It may contain minor errors which are inherent in voice recognition technology. **      Final report electronically signed by Dr. Jonathon Sneed on 6/12/2019 12:55 PM        No results found.           Electronically signed by Rossy Myers MD on 6/13/2019 at 5:07 PM

## 2019-06-13 NOTE — PLAN OF CARE
Problem: Pain:  Goal: Pain level will decrease  Description  Pain level will decrease  Outcome: Ongoing  Note:   Patient denies pain this shift. Continue to monitor with assessments and purposeful hourly rounding. Problem: Falls - Risk of:  Goal: Will remain free from falls  Description  Will remain free from falls  Outcome: Ongoing  Note:   Patient remains free from falls this shift. Continue to implement fall risk interventions such as non skid socks, call light and table within reach, bed alarm, falling star posted outside room, and purposeful hourly rounding. Problem: Falls - Risk of:  Goal: Absence of physical injury  Description  Absence of physical injury  Outcome: Ongoing  Note:   Please see above. Problem: DAILY CARE  Goal: Daily care needs are met  Outcome: Ongoing  Note:   Patient able to complete ADLs. Continue to encourage self care. Assist when appropriate. Problem: SKIN INTEGRITY  Goal: Skin integrity is maintained or improved  Outcome: Ongoing  Note:   Patient is without skin breakdown this shift. Continue to monitor with assessments. Inform physician of any skin breakdown. Problem: KNOWLEDGE DEFICIT  Goal: Patient/S.O. demonstrates understanding of disease process, treatment plan, medications, and discharge instructions. Outcome: Ongoing  Note:   Patient verbalizes understanding of treatment plan. Asks appropriate questions regarding care. Continue to encourage participation of patient in treatment plan. Problem: DISCHARGE BARRIERS  Goal: Patient's continuum of care needs are met  Outcome: Ongoing  Note:   Plan for patient to return home at the end of stay. Care plan reviewed with patient. Patient verbalizes understanding of the plan of care and contributes to goal setting.

## 2019-06-13 NOTE — PROGRESS NOTES
Cardiology Progress Note      Patient:  Todd Philippe  YOB: 1955  MRN: [id number]   Acct: [de-identified]  Admit Date:  6/11/2019  Primary Cardiologist: none  Pt seen by dr Tee Smalls    Note per dr Juwan Burrows:  Bradycardia.     REQUESTING PROVIDER:  Hospitalist Service.     HISTORY OF PRESENT ILLNESS:  This is a very pleasant 61-year-old patient  who apparently has had some muscles spasms lately and was being evaluate  by family physician. She underwent blood work as an outpatient  including magnesium level which was relatively low. She was supposed to  go in for magnesium replacement therapy and underwent infusion with  magnesium IV after which she was noted to be bradycardiac, heart rate in  the upper 30s, low 40s. She was sent to the emergency room for  evaluation. The patient was found to have sinus bradycardia; however,  she was also found have some renal dysfunction. She ended up being  admitted for bradycardia and we were consulted to assist in the  management of the patient. Upon further interviewing the patient, she  denies any knowledge of any previous arrhythmia. She denies any history  of coronary artery disease. Denies any previous syncope; however, she  might have had some dizziness and lightheadedness at times. She does  have a history of sleep apnea and has been on a CPAP. She also has a  history of diabetes. \"    Subjective (Events in last 24 hours): pt awake and alert. NAD. No cp or sob.   No lightheadedness or syncope      Objective:   /71   Pulse 51   Temp 97.5 °F (36.4 °C) (Oral)   Resp 16   Ht 5' 4\" (1.626 m)   Wt (!) 317 lb 3.2 oz (143.9 kg)   SpO2 96%   BMI 54.45 kg/m²        TELEMETRY: s.b. 55    Physical Exam:  General Appearance: alert and oriented to person, place and time, in no acute distress  Cardiovascular: normal rate, regular rhythm, normal S1 and S2, no murmurs, rubs, clicks, or gallops, distal pulses intact, no carotid bruits, no JVD  Pulmonary/Chest: clear to auscultation bilaterally- no wheezes, rales or rhonchi, normal air movement, no respiratory distress  Abdomen: soft, non-tender, non-distended, normal bowel sounds, no masses Extremities: no cyanosis, clubbing or edema, pulse   Skin: warm and dry, no rash or erythema  Head: normocephalic and atraumatic  Eyes: pupils equal, round, and reactive to light  Neck: supple and non-tender without mass, no thyromegaly   Musculoskeletal: normal range of motion, no joint swelling, deformity or tenderness  Neurological: alert, oriented, normal speech, no focal findings or movement disorder noted    Medications:    amoxicillin-clavulanate  1 tablet Oral 2 times per day    famotidine  20 mg Oral Daily    sodium bicarbonate  1,300 mg Oral BID    amLODIPine  10 mg Oral Daily    aspirin  81 mg Oral Daily    mometasone-formoterol  2 puff Inhalation BID    levothyroxine  50 mcg Oral Daily    sertraline  50 mg Oral Daily    sodium chloride flush  10 mL Intravenous 2 times per day    enoxaparin  40 mg Subcutaneous Q24H    polyethylene glycol  17 g Oral Daily    insulin lispro  0-6 Units Subcutaneous TID WC    insulin lispro  0-3 Units Subcutaneous Nightly      lactated ringers 100 mL/hr at 06/12/19 1608    dextrose         hydrALAZINE 5 mg Q6H PRN   acetaminophen 650 mg Q8H PRN   albuterol sulfate HFA 2 puff Q6H PRN   sodium chloride flush 10 mL PRN   ondansetron 4 mg Q6H PRN   glucose 15 g PRN   dextrose 12.5 g PRN   glucagon (rDNA) 1 mg PRN   dextrose 100 mL/hr PRN       Lab Data:    Cardiac Enzymes:  No results for input(s): CKTOTAL, CKMB, CKMBINDEX, TROPONINI in the last 72 hours.     CBC:   Lab Results   Component Value Date    WBC 10.4 06/12/2019    RBC 3.99 06/12/2019    HGB 12.5 06/12/2019    HCT 36.8 06/12/2019     06/12/2019       CMP:  Lab Results   Component Value Date     06/12/2019    K 4.0 06/12/2019     06/12/2019    CO2 18 06/12/2019    BUN 68 06/12/2019    CREATININE 3.2 06/12/2019    LABGLOM 14 06/12/2019    GLUCOSE 120 06/12/2019    CALCIUM 9.7 06/12/2019       Hepatic Function Panel:  No results found for: ALKPHOS, ALT, AST, PROT, BILITOT, BILIDIR, IBILI, LABALBU    Magnesium:    Lab Results   Component Value Date    MG 2.6 06/12/2019       PT/INR:    Lab Results   Component Value Date    INR 0.97 04/21/2018       HgBA1c:    Lab Results   Component Value Date    LABA1C 6.4 06/11/2019       FLP:  Lab Results   Component Value Date    TRIG 197 09/29/2017    HDL 34 09/29/2017    LDLCALC 93 09/29/2017       TSH:    Lab Results   Component Value Date    TSH 3.640 06/11/2019         Assessment:    Sinus bradycardia - possibly related to magnesium infusion and/or RON  MARGIE - renal following  HTN  DM  RON  Obesity    Plan:  · No AVN blocking medications  · Event monitor upon discharge  · Normal tsh  · Echo pending, if WNL will see prn and f/up 5-6 weeks with dr Bambi Tanner         Electronically signed by Slim Baeza PA-C on 6/13/2019 at 11:28 AM

## 2019-06-13 NOTE — CARE COORDINATION
6/13/19, 11:44 AM      [facility] day: 2  Location: [address] [place] [address]- Reason for admit: Bradycardia [R00.1]   Procedure: 6/13 Echo, results pending  Treatment Plan of Care: Hospitalist, Nephrology and Cardiology following. IVF continues. Echo to be done. If results are normal, Cardiology will follow prn. Lovenox. Po Augmentin. PCP: Lissa CHAPMAN  Readmission Risk Score: 11%  Discharge Plan: Pt lives alone. Denies needs.

## 2019-06-13 NOTE — FLOWSHEET NOTE
06/12/19 1310   Encounter Summary   Services provided to: Patient   Referral/Consult From: Rounding   Continue Visiting Yes  (6/12)   Complexity of Encounter Moderate   Length of Encounter 15 minutes   Routine   Type Initial   Assessment Approachable   Intervention Prayer;Nurtured hope   Outcome Coping;Encouraged    Initial Spiritual Care Contact:     Pt was pleased for the spiritual care contact. She welcomed prayer. She is 64 and on 3b. No family was in the room. Follow up as needed.

## 2019-06-13 NOTE — PROGRESS NOTES
HGB 14.0 12.5    149     CMP:    Recent Labs     06/11/19  2108 06/12/19  0400    135   K 4.3 4.0    105   CO2 15* 18*   BUN 69* 68*   CREATININE 3.4* 3.2*   GLUCOSE 116* 120*   CALCIUM 10.2 9.7   LABGLOM 14* 14*     Troponin: No results for input(s): TROPONINI in the last 72 hours. BNP: No results for input(s): BNP in the last 72 hours. INR: No results for input(s): INR in the last 72 hours. Lipids: No results for input(s): CHOL, LDLDIRECT, TRIG, HDL, AMYLASE, LIPASE in the last 72 hours. Liver: No results for input(s): AST, ALT, ALKPHOS, PROT, LABALBU, BILITOT in the last 72 hours. Invalid input(s): BILDIR  Iron:  No results for input(s): IRONS, FERRITIN in the last 72 hours. Invalid input(s): LABIRONS    Objective:   Vitals: /71   Pulse 51   Temp 97.5 °F (36.4 °C) (Oral)   Resp 16   Ht 5' 4\" (1.626 m)   Wt (!) 317 lb 3.2 oz (143.9 kg)   SpO2 96%   BMI 54.45 kg/m²    Wt Readings from Last 3 Encounters:   06/13/19 (!) 317 lb 3.2 oz (143.9 kg)   09/18/18 (!) 346 lb 3.2 oz (157 kg)   05/16/18 (!) 362 lb 3.2 oz (164.3 kg)      24HR INTAKE/OUTPUT:      Intake/Output Summary (Last 24 hours) at 6/13/2019 1140  Last data filed at 6/13/2019 0559  Gross per 24 hour   Intake 3482.14 ml   Output 3825 ml   Net -342.86 ml       Constitutional:  comfortably asleep this morning  Skin:normal   HEENT:Pupils are reactive . Throat is clear  Neck:supple with no thyromegaly  Cardiovascular:  S1, S2 without murmurs or rubs  Respiratory: Clear to auscultation in the anterior  Abdomen: +bs, soft, nontender  Ext: Trace to 1+ LE edema  Musculoskeletal:Intact  Neuro: Deferred      Electronically signed by Dmitry Lundberg MD on 6/13/2019 at 11:40 AM

## 2019-06-14 LAB
ANA SCREEN: NORMAL
ANION GAP SERPL CALCULATED.3IONS-SCNC: 13 MEQ/L (ref 8–16)
BUN BLDV-MCNC: 48 MG/DL (ref 7–22)
C3 COMPLEMENT: 160 MG/DL (ref 88–201)
C4 COMPLEMENT: 32 MG/DL (ref 10–40)
CALCIUM SERPL-MCNC: 9.3 MG/DL (ref 8.5–10.5)
CHLORIDE BLD-SCNC: 106 MEQ/L (ref 98–111)
CO2: 22 MEQ/L (ref 23–33)
CREAT SERPL-MCNC: 2.6 MG/DL (ref 0.4–1.2)
GFR SERPL CREATININE-BSD FRML MDRD: 18 ML/MIN/1.73M2
GLUCOSE BLD-MCNC: 115 MG/DL (ref 70–108)
GLUCOSE BLD-MCNC: 116 MG/DL (ref 70–108)
GLUCOSE BLD-MCNC: 117 MG/DL (ref 70–108)
GLUCOSE BLD-MCNC: 121 MG/DL (ref 70–108)
GLUCOSE BLD-MCNC: 122 MG/DL (ref 70–108)
POTASSIUM SERPL-SCNC: 4.1 MEQ/L (ref 3.5–5.2)
SODIUM BLD-SCNC: 141 MEQ/L (ref 135–145)

## 2019-06-14 PROCEDURE — 2580000003 HC RX 258: Performed by: INTERNAL MEDICINE

## 2019-06-14 PROCEDURE — 2709999900 HC NON-CHARGEABLE SUPPLY

## 2019-06-14 PROCEDURE — 6360000002 HC RX W HCPCS: Performed by: INTERNAL MEDICINE

## 2019-06-14 PROCEDURE — 6370000000 HC RX 637 (ALT 250 FOR IP): Performed by: INTERNAL MEDICINE

## 2019-06-14 PROCEDURE — 36415 COLL VENOUS BLD VENIPUNCTURE: CPT

## 2019-06-14 PROCEDURE — 2140000000 HC CCU INTERMEDIATE R&B

## 2019-06-14 PROCEDURE — 99232 SBSQ HOSP IP/OBS MODERATE 35: CPT | Performed by: INTERNAL MEDICINE

## 2019-06-14 PROCEDURE — 82948 REAGENT STRIP/BLOOD GLUCOSE: CPT

## 2019-06-14 PROCEDURE — 80048 BASIC METABOLIC PNL TOTAL CA: CPT

## 2019-06-14 PROCEDURE — 94640 AIRWAY INHALATION TREATMENT: CPT

## 2019-06-14 RX ORDER — CETIRIZINE HYDROCHLORIDE 10 MG/1
5 TABLET ORAL DAILY
Status: DISCONTINUED | OUTPATIENT
Start: 2019-06-15 | End: 2019-06-18 | Stop reason: HOSPADM

## 2019-06-14 RX ORDER — HYDRALAZINE HYDROCHLORIDE 50 MG/1
100 TABLET, FILM COATED ORAL EVERY 8 HOURS SCHEDULED
Status: DISCONTINUED | OUTPATIENT
Start: 2019-06-14 | End: 2019-06-18 | Stop reason: HOSPADM

## 2019-06-14 RX ADMIN — SERTRALINE 50 MG: 50 TABLET, FILM COATED ORAL at 21:18

## 2019-06-14 RX ADMIN — Medication 10 ML: at 21:18

## 2019-06-14 RX ADMIN — LEVOTHYROXINE SODIUM 50 MCG: 50 TABLET ORAL at 06:26

## 2019-06-14 RX ADMIN — Medication 2 PUFF: at 21:32

## 2019-06-14 RX ADMIN — Medication 2 PUFF: at 09:57

## 2019-06-14 RX ADMIN — HYDRALAZINE HYDROCHLORIDE 100 MG: 50 TABLET, FILM COATED ORAL at 21:18

## 2019-06-14 RX ADMIN — ASPIRIN 81 MG: 81 TABLET ORAL at 21:18

## 2019-06-14 RX ADMIN — AMOXICILLIN AND CLAVULANATE POTASSIUM 1 TABLET: 500; 125 TABLET, FILM COATED ORAL at 09:05

## 2019-06-14 RX ADMIN — FAMOTIDINE 20 MG: 20 TABLET, FILM COATED ORAL at 09:04

## 2019-06-14 RX ADMIN — SODIUM CHLORIDE, POTASSIUM CHLORIDE, SODIUM LACTATE AND CALCIUM CHLORIDE: 600; 310; 30; 20 INJECTION, SOLUTION INTRAVENOUS at 16:12

## 2019-06-14 RX ADMIN — HYDRALAZINE HYDROCHLORIDE 5 MG: 20 INJECTION INTRAMUSCULAR; INTRAVENOUS at 02:50

## 2019-06-14 RX ADMIN — ACETAMINOPHEN 650 MG: 325 TABLET ORAL at 02:45

## 2019-06-14 RX ADMIN — AMOXICILLIN AND CLAVULANATE POTASSIUM 1 TABLET: 500; 125 TABLET, FILM COATED ORAL at 21:18

## 2019-06-14 RX ADMIN — ENOXAPARIN SODIUM 40 MG: 40 INJECTION SUBCUTANEOUS at 23:32

## 2019-06-14 RX ADMIN — HYDRALAZINE HYDROCHLORIDE 100 MG: 50 TABLET, FILM COATED ORAL at 13:39

## 2019-06-14 RX ADMIN — SODIUM BICARBONATE 1300 MG: 650 TABLET ORAL at 21:19

## 2019-06-14 RX ADMIN — ACETAMINOPHEN 650 MG: 325 TABLET ORAL at 21:18

## 2019-06-14 RX ADMIN — AMLODIPINE BESYLATE 10 MG: 10 TABLET ORAL at 09:04

## 2019-06-14 RX ADMIN — SODIUM BICARBONATE 1300 MG: 650 TABLET ORAL at 09:05

## 2019-06-14 ASSESSMENT — PAIN SCALES - GENERAL
PAINLEVEL_OUTOF10: 2
PAINLEVEL_OUTOF10: 0
PAINLEVEL_OUTOF10: 0
PAINLEVEL_OUTOF10: 2
PAINLEVEL_OUTOF10: 2

## 2019-06-14 ASSESSMENT — PAIN DESCRIPTION - DESCRIPTORS
DESCRIPTORS: ACHING;HEADACHE
DESCRIPTORS: ACHING;DISCOMFORT;HEADACHE

## 2019-06-14 ASSESSMENT — PAIN DESCRIPTION - PROGRESSION
CLINICAL_PROGRESSION: NOT CHANGED
CLINICAL_PROGRESSION: NOT CHANGED

## 2019-06-14 ASSESSMENT — PAIN DESCRIPTION - FREQUENCY
FREQUENCY: CONTINUOUS
FREQUENCY: CONTINUOUS

## 2019-06-14 ASSESSMENT — PAIN - FUNCTIONAL ASSESSMENT
PAIN_FUNCTIONAL_ASSESSMENT: ACTIVITIES ARE NOT PREVENTED
PAIN_FUNCTIONAL_ASSESSMENT: ACTIVITIES ARE NOT PREVENTED

## 2019-06-14 ASSESSMENT — PAIN DESCRIPTION - PAIN TYPE
TYPE: ACUTE PAIN
TYPE: ACUTE PAIN

## 2019-06-14 ASSESSMENT — PAIN DESCRIPTION - LOCATION
LOCATION: HEAD
LOCATION: HEAD

## 2019-06-14 ASSESSMENT — PAIN DESCRIPTION - ONSET
ONSET: ON-GOING
ONSET: ON-GOING

## 2019-06-14 ASSESSMENT — PAIN DESCRIPTION - ORIENTATION: ORIENTATION: MID

## 2019-06-14 NOTE — PLAN OF CARE
Problem: RESPIRATORY  Goal: STG - patient can administer MDI's  Outcome: Ongoing   Pt takes Symbicort at home.

## 2019-06-14 NOTE — PROGRESS NOTES
IV therapy called to place INT; this RN placed a 24G in the patient's right arm. Upon placement, slight bruising was seen by insertion site. This RN was able to easily flush IV, and able to aspirate blood from IV. The patient denied any pain or burning sensation in the IV site. This RN informed the primary RN of the slight bruise, but that the IV is working well and in place. This RN asked that the primary RN calls IV therapy if any issues arrive with the IV. The primary RN agrees.

## 2019-06-14 NOTE — PROGRESS NOTES
Hospitalist Progress Note      Patient:  Earle Del Real      Unit/Bed:3B-35/035-A    YOB: 1955    MRN: [id number]       Acct: [de-identified]     PCP: [address]    Date of Admission: 6/11/2019    Assessment/Plan:    · Acute kidney injury-likely secondary to the use of NSAID, diuretic also patient is bradycardic, which can lead to renal hypoperfusion-improving levels -nephrology following IV fluids ordered-kidney ultrasound changes of chronic kidney disease- hold all nephrotoxic agent-BMP a.m.-slowly improving however not at baseline-nephrology following    · Bradycardia-patient is not on beta-blockers however uses clonidine at home-however patient has obstructive sleep apnea and is noncompliant with her CPAP-we will order CPAP here-issues with major noncompliance-problem follow-up as outpatient    · Essential hypertension-holding clonidine, secondary to bradycardia -we will add hydralazine as needed for systolic blood pressures above 160-also add hydralazine oral\  · UTI E. coli growing -added Augmentin for 5 days continue  · Acute metabolic acidosis likely secondary to acute kidney injury-on IV fluid-and on bicarbonate tablet twice daily    · Type 2 diabetes mellitus with questionable chronic kidney disease related to diabetic nephrosclerosis-however her previous creatinine is normal-continue home medication for diabetes- -     · History of depression on Zoloft continue  · History of GERD was on Protonix at home however held secondary to acute kidney injury added famotidine instead  · Morbid obesity-lifestyle modification  · History of mild asthma-on Dulera at home continue  · History of hypothyroidism on Synthyroid-continue-TSH and free T4 checked in its normal        Expected discharge date:  tbd depends on creatinine and blood pressures    Disposition:    [x] Home       [] TCU       [] Rehab       [] Psych       [] SNF       [] [place]       [] Other-    Chief Complaint:   Low heart rate    Initial H and P:-  ED  75 y.o. female who presented to [facility] with \"low heart rate. The patient was in an outpatient center receiving a magnesium infusion when it was noted when checking her vital signs, but her heart rate was low in the high 30s. The patient denies a history of this. She states she has no history of heart disease. She states she has felt fatigued however denies dizziness, near syncope, syncope, chest pain, or palpitations. She states she receives frequent magnesium infusions for hypomagnesemia\"-per  note and confirmed by myself      Subjective (past 24 hours):     Feels better  Doing okay  No chest pain no shortness of breath  Does have mild headache      Past medical history, family history and social history reviewed again and is unchanged since admission.   Medications:  Reviewed    Infusion Medications    lactated ringers 100 mL/hr at 06/14/19 1612    dextrose       Scheduled Medications    hydrALAZINE  100 mg Oral 3 times per day    amoxicillin-clavulanate  1 tablet Oral 2 times per day    famotidine  20 mg Oral Daily    sodium bicarbonate  1,300 mg Oral BID    amLODIPine  10 mg Oral Daily    aspirin  81 mg Oral Daily    mometasone-formoterol  2 puff Inhalation BID    levothyroxine  50 mcg Oral Daily    sertraline  50 mg Oral Daily    sodium chloride flush  10 mL Intravenous 2 times per day    enoxaparin  40 mg Subcutaneous Q24H    polyethylene glycol  17 g Oral Daily    insulin lispro  0-6 Units Subcutaneous TID WC    insulin lispro  0-3 Units Subcutaneous Nightly     PRN Meds: hydrALAZINE, acetaminophen, albuterol sulfate HFA, sodium chloride flush, ondansetron, glucose, dextrose, glucagon (rDNA), dextrose      Intake/Output Summary (Last 24 hours) at 6/14/2019 1826  Last data filed at 6/14/2019 1455  Gross per 24 hour   Intake 2460.48 ml   Output 1800 ml   Net 660.48 ml       Diet:  DIET CARB CONTROL; Exam:  BP (!) 146/68   Pulse 64   Temp 98.5 °F (36.9 °C) (Oral)   Resp 20   Ht 5' 4\" (1.626 m)   Wt (!) 316 lb 14.4 oz (143.7 kg)   SpO2 97%   BMI 54.40 kg/m²     General appearance: No apparent distress, appears stated age and cooperative. HEENT: Pupils equal, round, and reactive to light. Conjunctivae/corneas clear. Neck: Supple, with full range of motion. No jugular venous distention. Trachea midline. Respiratory:  Normal respiratory effort. Clear to auscultation, bilaterally without Rales/Wheezes/Rhonchi. Cardiovascular: Regular rate and rhythm with normal S1/S2 without murmurs, rubs or gallops. Abdomen: Soft, non-tender, non-distended with normal bowel sounds. Musculoskeletal: passive and active ROM x 4 extremities. Skin: Skin color, texture, turgor normal.  No rashes or lesions. Neurologic:  Neurovascularly intact without any focal sensory/motor deficits. Cranial nerves: II-XII intact, grossly non-focal.  Psychiatric: Alert and oriented, thought content appropriate, normal insight  Capillary Refill: Brisk,< 3 seconds   Peripheral Pulses: +2 palpable, equal bilaterally       Labs:   Recent Labs     06/11/19  2119 06/12/19  0400   WBC 12.8* 10.4   HGB 14.0 12.5   HCT 42.2 36.8*    149     Recent Labs     06/12/19  0400 06/13/19  1231 06/14/19  0342    139 141   K 4.0 4.6 4.1    108 106   CO2 18* 19* 22*   BUN 68* 55* 48*   CREATININE 3.2* 2.7* 2.6*   CALCIUM 9.7 9.2 9.3     No results for input(s): AST, ALT, BILIDIR, BILITOT, ALKPHOS in the last 72 hours. No results for input(s): INR in the last 72 hours. No results for input(s): Terryann Haste in the last 72 hours.     Microbiology:      Urinalysis:      Lab Results   Component Value Date    NITRU NEGATIVE 06/11/2019    WBCUA 5-10 06/11/2019    BACTERIA MANY 06/11/2019    RBCUA 0-2 06/11/2019    BLOODU NEGATIVE 06/11/2019    SPECGRAV 1.012 06/11/2019

## 2019-06-14 NOTE — PROGRESS NOTES
0-6 Units Subcutaneous TID WC    insulin lispro  0-3 Units Subcutaneous Nightly     Continuous Infusions:   lactated ringers 100 mL/hr at 06/13/19 1328    dextrose         CBC:   Recent Labs     06/11/19  2119 06/12/19  0400   WBC 12.8* 10.4   HGB 14.0 12.5    149     CMP:    Recent Labs     06/12/19  0400 06/13/19  1231 06/14/19  0342    139 141   K 4.0 4.6 4.1    108 106   CO2 18* 19* 22*   BUN 68* 55* 48*   CREATININE 3.2* 2.7* 2.6*   GLUCOSE 120* 154* 121*   CALCIUM 9.7 9.2 9.3   LABGLOM 14* 18* 18*     Troponin: No results for input(s): TROPONINI in the last 72 hours. BNP: No results for input(s): BNP in the last 72 hours. INR: No results for input(s): INR in the last 72 hours. Lipids: No results for input(s): CHOL, LDLDIRECT, TRIG, HDL, AMYLASE, LIPASE in the last 72 hours. Liver: No results for input(s): AST, ALT, ALKPHOS, PROT, LABALBU, BILITOT in the last 72 hours. Invalid input(s): BILDIR  Iron:  No results for input(s): IRONS, FERRITIN in the last 72 hours. Invalid input(s): LABIRONS    Objective:   Vitals: BP (!) 188/83   Pulse 54   Temp 97.4 °F (36.3 °C) (Oral)   Resp 20   Ht 5' 4\" (1.626 m)   Wt (!) 316 lb 14.4 oz (143.7 kg)   SpO2 96%   BMI 54.40 kg/m²    Wt Readings from Last 3 Encounters:   06/14/19 (!) 316 lb 14.4 oz (143.7 kg)   09/18/18 (!) 346 lb 3.2 oz (157 kg)   05/16/18 (!) 362 lb 3.2 oz (164.3 kg)      24HR INTAKE/OUTPUT:      Intake/Output Summary (Last 24 hours) at 6/14/2019 1124  Last data filed at 6/14/2019 0413  Gross per 24 hour   Intake 3340.48 ml   Output 2150 ml   Net 1190.48 ml       Constitutional:   very awake and alert with no complaint  Skin:normal   HEENT:Pupils are reactive . Throat is clear  Neck:supple with no thyromegaly  Cardiovascular:  S1, S2 without murmurs or rubs  Respiratory: Clear to auscultation in the anterior  Abdomen: +bs, soft, nontender  Ext: Trace to 1+ LE edema  Musculoskeletal:Intact  Neuro: Awake and alert.   Normal speech. Good motor strength. No focal deficit. Well-oriented.       Electronically signed by Delicia Montes MD on 6/14/2019 at 11:24 AM

## 2019-06-15 LAB
ANION GAP SERPL CALCULATED.3IONS-SCNC: 17 MEQ/L (ref 8–16)
BUN BLDV-MCNC: 38 MG/DL (ref 7–22)
CALCIUM SERPL-MCNC: 9.7 MG/DL (ref 8.5–10.5)
CHLORIDE BLD-SCNC: 105 MEQ/L (ref 98–111)
CO2: 22 MEQ/L (ref 23–33)
CREAT SERPL-MCNC: 2.1 MG/DL (ref 0.4–1.2)
GFR SERPL CREATININE-BSD FRML MDRD: 24 ML/MIN/1.73M2
GLUCOSE BLD-MCNC: 109 MG/DL (ref 70–108)
GLUCOSE BLD-MCNC: 116 MG/DL (ref 70–108)
GLUCOSE BLD-MCNC: 116 MG/DL (ref 70–108)
GLUCOSE BLD-MCNC: 147 MG/DL (ref 70–108)
GLUCOSE BLD-MCNC: 159 MG/DL (ref 70–108)
POTASSIUM SERPL-SCNC: 4 MEQ/L (ref 3.5–5.2)
SODIUM BLD-SCNC: 144 MEQ/L (ref 135–145)

## 2019-06-15 PROCEDURE — 2709999900 HC NON-CHARGEABLE SUPPLY

## 2019-06-15 PROCEDURE — 2140000000 HC CCU INTERMEDIATE R&B

## 2019-06-15 PROCEDURE — 6370000000 HC RX 637 (ALT 250 FOR IP): Performed by: PHYSICIAN ASSISTANT

## 2019-06-15 PROCEDURE — 2580000003 HC RX 258: Performed by: INTERNAL MEDICINE

## 2019-06-15 PROCEDURE — 6370000000 HC RX 637 (ALT 250 FOR IP): Performed by: INTERNAL MEDICINE

## 2019-06-15 PROCEDURE — 6360000002 HC RX W HCPCS: Performed by: INTERNAL MEDICINE

## 2019-06-15 PROCEDURE — 36415 COLL VENOUS BLD VENIPUNCTURE: CPT

## 2019-06-15 PROCEDURE — 80048 BASIC METABOLIC PNL TOTAL CA: CPT

## 2019-06-15 PROCEDURE — 99232 SBSQ HOSP IP/OBS MODERATE 35: CPT | Performed by: INTERNAL MEDICINE

## 2019-06-15 PROCEDURE — 82948 REAGENT STRIP/BLOOD GLUCOSE: CPT

## 2019-06-15 RX ADMIN — INSULIN LISPRO 1 UNITS: 100 INJECTION, SOLUTION INTRAVENOUS; SUBCUTANEOUS at 20:37

## 2019-06-15 RX ADMIN — ASPIRIN 81 MG: 81 TABLET ORAL at 20:35

## 2019-06-15 RX ADMIN — ENOXAPARIN SODIUM 40 MG: 40 INJECTION SUBCUTANEOUS at 20:36

## 2019-06-15 RX ADMIN — AMOXICILLIN AND CLAVULANATE POTASSIUM 1 TABLET: 500; 125 TABLET, FILM COATED ORAL at 08:53

## 2019-06-15 RX ADMIN — CETIRIZINE HYDROCHLORIDE 5 MG: 10 TABLET, FILM COATED ORAL at 08:54

## 2019-06-15 RX ADMIN — AMLODIPINE BESYLATE 10 MG: 10 TABLET ORAL at 08:53

## 2019-06-15 RX ADMIN — SODIUM CHLORIDE, POTASSIUM CHLORIDE, SODIUM LACTATE AND CALCIUM CHLORIDE: 600; 310; 30; 20 INJECTION, SOLUTION INTRAVENOUS at 18:19

## 2019-06-15 RX ADMIN — AMOXICILLIN AND CLAVULANATE POTASSIUM 1 TABLET: 500; 125 TABLET, FILM COATED ORAL at 20:35

## 2019-06-15 RX ADMIN — SODIUM BICARBONATE 1300 MG: 650 TABLET ORAL at 20:35

## 2019-06-15 RX ADMIN — SERTRALINE 50 MG: 50 TABLET, FILM COATED ORAL at 20:35

## 2019-06-15 RX ADMIN — LEVOTHYROXINE SODIUM 50 MCG: 50 TABLET ORAL at 06:31

## 2019-06-15 RX ADMIN — FAMOTIDINE 20 MG: 20 TABLET, FILM COATED ORAL at 08:55

## 2019-06-15 RX ADMIN — HYDRALAZINE HYDROCHLORIDE 100 MG: 50 TABLET, FILM COATED ORAL at 20:35

## 2019-06-15 RX ADMIN — SODIUM BICARBONATE 1300 MG: 650 TABLET ORAL at 08:54

## 2019-06-15 RX ADMIN — HYDRALAZINE HYDROCHLORIDE 100 MG: 50 TABLET, FILM COATED ORAL at 06:31

## 2019-06-15 RX ADMIN — HYDRALAZINE HYDROCHLORIDE 100 MG: 50 TABLET, FILM COATED ORAL at 14:13

## 2019-06-15 RX ADMIN — ACETAMINOPHEN 650 MG: 325 TABLET ORAL at 18:18

## 2019-06-15 ASSESSMENT — PAIN DESCRIPTION - PROGRESSION: CLINICAL_PROGRESSION: NOT CHANGED

## 2019-06-15 ASSESSMENT — PAIN DESCRIPTION - ORIENTATION
ORIENTATION: MID
ORIENTATION: MID

## 2019-06-15 ASSESSMENT — PAIN DESCRIPTION - PAIN TYPE
TYPE: CHRONIC PAIN
TYPE: ACUTE PAIN

## 2019-06-15 ASSESSMENT — PAIN SCALES - GENERAL
PAINLEVEL_OUTOF10: 2
PAINLEVEL_OUTOF10: 3
PAINLEVEL_OUTOF10: 3

## 2019-06-15 ASSESSMENT — PAIN DESCRIPTION - LOCATION
LOCATION: BACK
LOCATION: HEAD

## 2019-06-15 ASSESSMENT — PAIN - FUNCTIONAL ASSESSMENT: PAIN_FUNCTIONAL_ASSESSMENT: ACTIVITIES ARE NOT PREVENTED

## 2019-06-15 ASSESSMENT — PAIN DESCRIPTION - ONSET: ONSET: GRADUAL

## 2019-06-15 ASSESSMENT — PAIN DESCRIPTION - FREQUENCY: FREQUENCY: CONTINUOUS

## 2019-06-15 ASSESSMENT — PAIN DESCRIPTION - DESCRIPTORS: DESCRIPTORS: HEAVINESS

## 2019-06-15 NOTE — PROGRESS NOTES
[facility]   Nephrology progress note  6/15/2019, 9:53 AM      Pt Name:    Maryjane Nguyen  MRN:     [id number]     [place]:    1955  Admit Date:    6/11/2019  7:12 PM  Primary Care Physician:  Moises Langley   Room number  3B-35/035-A    Chief Complaint: Nephrology following for ZAK    Subjective:  Patient seen and examined  No chest pain or shortness of breath  Feels okay    Objective:  24HR INTAKE/OUTPUT:      Intake/Output Summary (Last 24 hours) at 6/15/2019 0953  Last data filed at 6/15/2019 0612  Gross per 24 hour   Intake 2784.19 ml   Output 4200 ml   Net -1415.81 ml     I/O last 3 completed shifts: In: 3024.2 [P.O.:1440; I.V.:1584.2]  Out: 4200 [Urine:4200]  No intake/output data recorded. Admission weight: (!) 317 lb 8 oz (144 kg)  Wt Readings from Last 3 Encounters:   06/15/19 (!) 317 lb (143.8 kg)   09/18/18 (!) 346 lb 3.2 oz (157 kg)   05/16/18 (!) 362 lb 3.2 oz (164.3 kg)     Body mass index is 54.41 kg/m². Physical examination  VITALS:     Vitals:    06/15/19 0848   BP: (!) 126/58   Pulse: 72   Resp: 18   Temp: 98.1 °F (36.7 °C)   SpO2: 94%     General Appearance: alert and cooperative with exam, appears comfortable, no distress  Mouth/Throat: Oral mucosa moist  Neck: No JVD  Lungs: Air entry B/L, no rales, no use of accessory muscles  Heart:  S1, S2 heard  GI: soft, non-tender, no guarding  Extremities: no LE edema      Lab Data  CBC: No results for input(s): WBC, HGB, HCT, PLT in the last 72 hours. BMP:  Recent Labs     06/13/19  1231 06/14/19  0342    141   K 4.6 4.1    106   CO2 19* 22*   BUN 55* 48*   CREATININE 2.7* 2.6*   GLUCOSE 154* 121*   CALCIUM 9.2 9.3     Hepatic: No results for input(s): LABALBU, AST, ALT, ALB, BILITOT, ALKPHOS in the last 72 hours.       Meds:  Infusion:    lactated ringers 100 mL/hr at 06/14/19 1612    dextrose       Meds:    hydrALAZINE  100 mg Oral 3 times per day    cetirizine  5 mg Oral Daily    amoxicillin-clavulanate  1 tablet Oral 2 times per day    famotidine  20 mg Oral Daily    sodium bicarbonate  1,300 mg Oral BID    amLODIPine  10 mg Oral Daily    aspirin  81 mg Oral Daily    mometasone-formoterol  2 puff Inhalation BID    levothyroxine  50 mcg Oral Daily    sertraline  50 mg Oral Daily    sodium chloride flush  10 mL Intravenous 2 times per day    enoxaparin  40 mg Subcutaneous Q24H    polyethylene glycol  17 g Oral Daily    insulin lispro  0-6 Units Subcutaneous TID WC    insulin lispro  0-3 Units Subcutaneous Nightly     Meds prn: hydrALAZINE, acetaminophen, albuterol sulfate HFA, sodium chloride flush, ondansetron, glucose, dextrose, glucagon (rDNA), dextrose       Impression and Plan:  1. ZAK: slowly improving but not much better since yesterday  - peak creat 3.4 but now down to 2.6  - overall lytes and volume status stable  - on IVF for now  - no indication for renal replacement therapy    2. Bradycardia: improving/stable  3. HTN: on norvasc and hydralazine  4.  DM  5. MEt acidosis: continue with oral bicarbonate, improving    D/W patient   If discharged then see Dr. Ozzy Mason in 2 weeks with BMP  Otherwise will see her in am    Michelle Silva MD  [facility]

## 2019-06-15 NOTE — DISCHARGE INSTR - DIET

## 2019-06-15 NOTE — PROGRESS NOTES
Hospitalist Progress Note      Patient:  Sarah Parents      Unit/Bed:3B-35/035-A    YOB: 1955    MRN: [id number]       Acct: [de-identified]     PCP: [id number]    Date of Admission: 6/11/2019    Assessment/Plan:    · Acute kidney injury-likely secondary to the use of NSAID, diuretic also patient is bradycardic, which can lead to renal hypoperfusion-improving levels -nephrology following IV fluids ordered-kidney ultrasound changes of chronic kidney disease- hold all nephrotoxic agent-BMP a.m.-slowly improving however not at baseline-nephrology following-continue IV fluids for now    · Bradycardia-patient is not on beta-blockers however uses clonidine at home-however patient has obstructive sleep apnea and is noncompliant with her CPAP-we will order CPAP here-issues with major noncompliance-problem follow-up as outpatient    · Essential hypertension-holding clonidine, secondary to bradycardia -we will add hydralazine as needed for systolic blood pressures above 160-also add hydralazine oral\  · UTI E. coli growing -added Augmentin for 5 days continue  · Acute metabolic acidosis likely secondary to acute kidney injury-on IV fluid-and on bicarbonate tablet twice daily    · Type 2 diabetes mellitus with questionable chronic kidney disease related to diabetic nephrosclerosis-however her previous creatinine is normal-continue home medication for diabetes- -     · History of depression on Zoloft continue  · History of GERD was on Protonix at home however held secondary to acute kidney injury added famotidine instead  · Morbid obesity-lifestyle modification  · History of mild asthma-on Dulera at home continue  · History of hypothyroidism on Synthyroid-continue-TSH and free T4 checked in its normal    Will plan discharge if creatinine is less than 2  BMP in 2 weeks per nephrology as outpatient follow-up    Expected discharge date:  tbd depends on creatinine and blood pressures    Disposition:    [x] Home       [] TCU       [] Rehab       [] Psych       [] SNF       [] [facility]       [] Other-    Chief Complaint:   Low heart rate    Initial H and P:-  ED  75 y.o. female who presented to [facility] with \"low heart rate. The patient was in an outpatient center receiving a magnesium infusion when it was noted when checking her vital signs, but her heart rate was low in the high 30s. The patient denies a history of this. She states she has no history of heart disease. She states she has felt fatigued however denies dizziness, near syncope, syncope, chest pain, or palpitations. She states she receives frequent magnesium infusions for hypomagnesemia\"-per  note and confirmed by myself      Subjective (past 24 hours):     Blood pressure better    Creatinine improving to 2.1  No chest pain no shortness of breath no nausea vomiting or diarrhea    Past medical history, family history and social history reviewed again and is unchanged since admission.   Medications:  Reviewed    Infusion Medications    lactated ringers 100 mL/hr at 06/15/19 1819    dextrose       Scheduled Medications    hydrALAZINE  100 mg Oral 3 times per day    cetirizine  5 mg Oral Daily    amoxicillin-clavulanate  1 tablet Oral 2 times per day    famotidine  20 mg Oral Daily    sodium bicarbonate  1,300 mg Oral BID    amLODIPine  10 mg Oral Daily    aspirin  81 mg Oral Daily    mometasone-formoterol  2 puff Inhalation BID    levothyroxine  50 mcg Oral Daily    sertraline  50 mg Oral Daily    sodium chloride flush  10 mL Intravenous 2 times per day    enoxaparin  40 mg Subcutaneous Q24H    polyethylene glycol  17 g Oral Daily    insulin lispro  0-6 Units Subcutaneous TID     insulin lispro  0-3 Units Subcutaneous Nightly     PRN Meds: hydrALAZINE, acetaminophen, albuterol sulfate HFA, sodium chloride flush, ondansetron, glucose, dextrose, glucagon (rDNA), dextrose      Intake/Output Summary (Last 24 hours) at 6/15/2019 1859  Last data filed at 6/15/2019 1516  Gross per 24 hour   Intake 4267.47 ml   Output 3950 ml   Net 317.47 ml       Diet:  DIET CARB CONTROL; Exam:  BP (!) 158/75   Pulse 80   Temp 97.9 °F (36.6 °C) (Oral)   Resp 20   Ht 5' 4\" (1.626 m)   Wt (!) 317 lb (143.8 kg)   SpO2 96%   BMI 54.41 kg/m²     General appearance: No apparent distress, appears stated age and cooperative. HEENT: Pupils equal, round, and reactive to light. Conjunctivae/corneas clear. Neck: Supple, with full range of motion. No jugular venous distention. Trachea midline. Respiratory:  Normal respiratory effort. Clear to auscultation, bilaterally without Rales/Wheezes/Rhonchi. Cardiovascular: Regular rate and rhythm with normal S1/S2 without murmurs, rubs or gallops. Abdomen: Soft, non-tender, non-distended with normal bowel sounds. Musculoskeletal: passive and active ROM x 4 extremities. Skin: Skin color, texture, turgor normal.  No rashes or lesions. Neurologic:  Neurovascularly intact without any focal sensory/motor deficits. Cranial nerves: II-XII intact, grossly non-focal.  Psychiatric: Alert and oriented, thought content appropriate, normal insight  Capillary Refill: Brisk,< 3 seconds   Peripheral Pulses: +2 palpable, equal bilaterally       Labs:   No results for input(s): WBC, HGB, HCT, PLT in the last 72 hours. Recent Labs     06/13/19  1231 06/14/19  0342 06/15/19  0946    141 144   K 4.6 4.1 4.0    106 105   CO2 19* 22* 22*   BUN 55* 48* 38*   CREATININE 2.7* 2.6* 2.1*   CALCIUM 9.2 9.3 9.7     No results for input(s): AST, ALT, BILIDIR, BILITOT, ALKPHOS in the last 72 hours. No results for input(s): INR in the last 72 hours. No results for input(s): Ellender Guardado in the last 72 hours.     Microbiology:      Urinalysis:      Lab Results   Component Value Date    NITRU NEGATIVE 06/11/2019    WBCUA 5-10 06/11/2019    BACTERIA MANY 06/11/2019    RBCUA 0-2 06/11/2019    BLOODU NEGATIVE 06/11/2019    SPECGRAV 1.012 06/11/2019       Radiology:  US RENAL COMPLETE   Final Result   1. There is mild elevation of the left renal resistive index measuring 0.73. The sonographic appearance of the kidneys is otherwise within normal limits. 2. The urinary bladder is underdistended and contains low-level echoes. The urinary bladder is otherwise not adequately visualized or evaluated. Correlation should be made with a urinalysis. **This report has been created using voice recognition software. It may contain minor errors which are inherent in voice recognition technology. **      Final report electronically signed by Dr. Christophe Moser on 6/12/2019 12:55 PM        No results found.           Electronically signed by Smooth Montez MD on 6/15/2019 at 6:59 PM

## 2019-06-16 LAB
ANION GAP SERPL CALCULATED.3IONS-SCNC: 12 MEQ/L (ref 8–16)
BUN BLDV-MCNC: 34 MG/DL (ref 7–22)
CALCIUM SERPL-MCNC: 9.2 MG/DL (ref 8.5–10.5)
CHLORIDE BLD-SCNC: 106 MEQ/L (ref 98–111)
CO2: 24 MEQ/L (ref 23–33)
CREAT SERPL-MCNC: 2.4 MG/DL (ref 0.4–1.2)
GFR SERPL CREATININE-BSD FRML MDRD: 20 ML/MIN/1.73M2
GLUCOSE BLD-MCNC: 102 MG/DL (ref 70–108)
GLUCOSE BLD-MCNC: 111 MG/DL (ref 70–108)
GLUCOSE BLD-MCNC: 122 MG/DL (ref 70–108)
GLUCOSE BLD-MCNC: 123 MG/DL (ref 70–108)
GLUCOSE BLD-MCNC: 135 MG/DL (ref 70–108)
POTASSIUM SERPL-SCNC: 3.6 MEQ/L (ref 3.5–5.2)
SODIUM BLD-SCNC: 142 MEQ/L (ref 135–145)

## 2019-06-16 PROCEDURE — 1200000003 HC TELEMETRY R&B

## 2019-06-16 PROCEDURE — 6370000000 HC RX 637 (ALT 250 FOR IP): Performed by: INTERNAL MEDICINE

## 2019-06-16 PROCEDURE — 94640 AIRWAY INHALATION TREATMENT: CPT

## 2019-06-16 PROCEDURE — 2580000003 HC RX 258: Performed by: INTERNAL MEDICINE

## 2019-06-16 PROCEDURE — 99232 SBSQ HOSP IP/OBS MODERATE 35: CPT | Performed by: INTERNAL MEDICINE

## 2019-06-16 PROCEDURE — 80048 BASIC METABOLIC PNL TOTAL CA: CPT

## 2019-06-16 PROCEDURE — 36415 COLL VENOUS BLD VENIPUNCTURE: CPT

## 2019-06-16 PROCEDURE — 82948 REAGENT STRIP/BLOOD GLUCOSE: CPT

## 2019-06-16 PROCEDURE — 6370000000 HC RX 637 (ALT 250 FOR IP): Performed by: PHYSICIAN ASSISTANT

## 2019-06-16 PROCEDURE — 2709999900 HC NON-CHARGEABLE SUPPLY

## 2019-06-16 PROCEDURE — 6360000002 HC RX W HCPCS: Performed by: INTERNAL MEDICINE

## 2019-06-16 RX ORDER — SODIUM BICARBONATE 650 MG/1
650 TABLET ORAL 2 TIMES DAILY
Status: DISCONTINUED | OUTPATIENT
Start: 2019-06-16 | End: 2019-06-18 | Stop reason: HOSPADM

## 2019-06-16 RX ADMIN — FAMOTIDINE 20 MG: 20 TABLET, FILM COATED ORAL at 10:42

## 2019-06-16 RX ADMIN — SODIUM CHLORIDE, POTASSIUM CHLORIDE, SODIUM LACTATE AND CALCIUM CHLORIDE: 600; 310; 30; 20 INJECTION, SOLUTION INTRAVENOUS at 15:48

## 2019-06-16 RX ADMIN — Medication 2 PUFF: at 21:08

## 2019-06-16 RX ADMIN — HYDRALAZINE HYDROCHLORIDE 100 MG: 50 TABLET, FILM COATED ORAL at 23:08

## 2019-06-16 RX ADMIN — SODIUM CHLORIDE, POTASSIUM CHLORIDE, SODIUM LACTATE AND CALCIUM CHLORIDE: 600; 310; 30; 20 INJECTION, SOLUTION INTRAVENOUS at 04:26

## 2019-06-16 RX ADMIN — ASPIRIN 81 MG: 81 TABLET ORAL at 20:59

## 2019-06-16 RX ADMIN — CETIRIZINE HYDROCHLORIDE 5 MG: 10 TABLET, FILM COATED ORAL at 10:42

## 2019-06-16 RX ADMIN — AMOXICILLIN AND CLAVULANATE POTASSIUM 1 TABLET: 500; 125 TABLET, FILM COATED ORAL at 10:41

## 2019-06-16 RX ADMIN — ACETAMINOPHEN 650 MG: 325 TABLET ORAL at 16:00

## 2019-06-16 RX ADMIN — LEVOTHYROXINE SODIUM 50 MCG: 50 TABLET ORAL at 06:57

## 2019-06-16 RX ADMIN — AMLODIPINE BESYLATE 10 MG: 10 TABLET ORAL at 10:41

## 2019-06-16 RX ADMIN — Medication 2 PUFF: at 09:24

## 2019-06-16 RX ADMIN — SERTRALINE 50 MG: 50 TABLET, FILM COATED ORAL at 20:59

## 2019-06-16 RX ADMIN — AMOXICILLIN AND CLAVULANATE POTASSIUM 1 TABLET: 500; 125 TABLET, FILM COATED ORAL at 20:59

## 2019-06-16 RX ADMIN — SODIUM BICARBONATE 650 MG: 650 TABLET ORAL at 20:59

## 2019-06-16 RX ADMIN — HYDRALAZINE HYDROCHLORIDE 100 MG: 50 TABLET, FILM COATED ORAL at 12:09

## 2019-06-16 RX ADMIN — ENOXAPARIN SODIUM 40 MG: 40 INJECTION SUBCUTANEOUS at 23:09

## 2019-06-16 ASSESSMENT — PAIN DESCRIPTION - LOCATION
LOCATION: ABDOMEN
LOCATION: HEAD

## 2019-06-16 ASSESSMENT — PAIN SCALES - GENERAL
PAINLEVEL_OUTOF10: 0
PAINLEVEL_OUTOF10: 3
PAINLEVEL_OUTOF10: 3
PAINLEVEL_OUTOF10: 0

## 2019-06-16 ASSESSMENT — PAIN DESCRIPTION - PAIN TYPE
TYPE: ACUTE PAIN
TYPE: ACUTE PAIN

## 2019-06-16 NOTE — PROGRESS NOTES
[facility]   Nephrology progress note  6/16/2019, 10:08 AM      Pt Name:    Sarah Rai  MRN:     [id number]     [place]:    1955  Admit Date:    6/11/2019  7:12 PM  Primary Care Physician:  Moises Langley   Room number  3B-35/035-A    Chief Complaint: Nephrology following for ZAK    Subjective:  Patient seen and examined  No chest pain or shortness of breath  Feels good today    Objective:  24HR INTAKE/OUTPUT:      Intake/Output Summary (Last 24 hours) at 6/16/2019 1008  Last data filed at 6/16/2019 0711  Gross per 24 hour   Intake 2719.49 ml   Output 1525 ml   Net 1194.49 ml     I/O last 3 completed shifts: In: 2839.5 [P.O.:1040; I.V.:1799.5]  Out: 1225 [Urine:1225]  I/O this shift:  In: -   Out: 300 [Urine:300]  Admission weight: (!) 317 lb 8 oz (144 kg)  Wt Readings from Last 3 Encounters:   06/15/19 (!) 317 lb (143.8 kg)   09/18/18 (!) 346 lb 3.2 oz (157 kg)   05/16/18 (!) 362 lb 3.2 oz (164.3 kg)     Body mass index is 54.41 kg/m². Physical examination  VITALS:   R 18, T 98.3  Vitals:    06/16/19 0656   BP: (!) 123/56   Pulse: 59   Resp:    Temp:    SpO2:      General Appearance: alert and cooperative with exam, appears comfortable, no distress  Mouth/Throat: Oral mucosa moist  Neck: No JVD  Lungs: Air entry B/L, no rales, no use of accessory muscles  Heart:  S1, S2 heard  GI: soft, non-tender, no guarding  Extremities: no LE edema      Lab Data  CBC: No results for input(s): WBC, HGB, HCT, PLT in the last 72 hours. BMP:  Recent Labs     06/14/19  0342 06/15/19  0946 06/16/19  0359    144 142   K 4.1 4.0 3.6    105 106   CO2 22* 22* 24   BUN 48* 38* 34*   CREATININE 2.6* 2.1* 2.4*   GLUCOSE 121* 147* 123*   CALCIUM 9.3 9.7 9.2     Hepatic: No results for input(s): LABALBU, AST, ALT, ALB, BILITOT, ALKPHOS in the last 72 hours.       Meds:  Infusion:    lactated ringers 100 mL/hr at 06/16/19 0426    dextrose       Meds:    hydrALAZINE  100 mg Oral 3 times per day    cetirizine  5 mg Oral Daily    amoxicillin-clavulanate  1 tablet Oral 2 times per day    famotidine  20 mg Oral Daily    sodium bicarbonate  1,300 mg Oral BID    amLODIPine  10 mg Oral Daily    aspirin  81 mg Oral Daily    mometasone-formoterol  2 puff Inhalation BID    levothyroxine  50 mcg Oral Daily    sertraline  50 mg Oral Daily    sodium chloride flush  10 mL Intravenous 2 times per day    enoxaparin  40 mg Subcutaneous Q24H    polyethylene glycol  17 g Oral Daily    insulin lispro  0-6 Units Subcutaneous TID WC    insulin lispro  0-3 Units Subcutaneous Nightly     Meds prn: hydrALAZINE, acetaminophen, albuterol sulfate HFA, sodium chloride flush, ondansetron, glucose, dextrose, glucagon (rDNA), dextrose       Impression and Plan:  1. ZAK: was improving but upto 2.4 today  - no clear baseline, UA: no blood, trace protein, check UPCR  - no obstruction/hydronephrosis on US kidneys  - on IVF hydration   - etiology unclear: consideration include nephrotoxins including NSAID. Also has underlying HTN and DM. Check urine protein-creatinine ratio. She was also bradycardic on admission. Check urine protein-creatinine ratio and decide further  - may need to keep another night to ensure creatinine not rising further.   - if discharged then will need to see [facility] within 2 weeks with [facility] in 3 days after discharge  - for now continue with IVF     2. Bradycardia: improving/stable  3. HTN: on norvasc and hydralazine, avoid hypotension  4.  DM  5. MEt acidosis: improved, reduce dose to 650 mg sodium bicarbonate BID    D/W patient     Polina Waggoner MD  [facility]

## 2019-06-16 NOTE — PROGRESS NOTES
Awake, sitting up on edge of the bed. Assessment done. Asking when Dr Jameson Lewis will be in because she is anxious to go home. Denies pain , discomfort or SOB.

## 2019-06-16 NOTE — PROGRESS NOTES
instead  · Morbid obesity-lifestyle modification  · History of mild asthma-on Dulera at home continue  · History of hypothyroidism on Synthyroid-continue-TSH and free T4 checked in its normal    Will plan discharge if creatinine is less than 2  BMP in 2 weeks per nephrology as outpatient follow-up  Does not need stepdown unit will transfer to [place] [id number] telemetry floor      Expected discharge date:  tbd depends on creatinine and blood pressures    Disposition:    [x] Home       [] TCU       [] Rehab       [] Psych       [] SNF       [] [facility]       [] Other-    Chief Complaint:   Low heart rate    Initial H and P:-  ED  75 y.o. female who presented to [address] [place] [address] with \"low heart rate. The patient was in an outpatient center receiving a magnesium infusion when it was noted when checking her vital signs, but her heart rate was low in the high 30s. The patient denies a history of this. She states she has no history of heart disease. She states she has felt fatigued however denies dizziness, near syncope, syncope, chest pain, or palpitations. She states she receives frequent magnesium infusions for hypomagnesemia\"-per  note and confirmed by myself      Subjective (past 24 hours):     Blood pressure is uncontrolled because the blood pressures meds were held today morning and last night  Creatinine is worse    Past medical history, family history and social history reviewed again and is unchanged since admission.   Medications:  Reviewed    Infusion Medications    lactated ringers 100 mL/hr at 06/16/19 0426    dextrose       Scheduled Medications    sodium bicarbonate  650 mg Oral BID    hydrALAZINE  100 mg Oral 3 times per day    cetirizine  5 mg Oral Daily    amoxicillin-clavulanate  1 tablet Oral 2 times per day    famotidine  20 mg Oral Daily    amLODIPine  10 mg Oral Daily    aspirin  81 mg Oral Daily    mometasone-formoterol  2 puff Inhalation BID    levothyroxine  50 mcg Oral Daily    sertraline  50 mg Oral Daily    sodium chloride flush  10 mL Intravenous 2 times per day    enoxaparin  40 mg Subcutaneous Q24H    polyethylene glycol  17 g Oral Daily    insulin lispro  0-6 Units Subcutaneous TID WC    insulin lispro  0-3 Units Subcutaneous Nightly     PRN Meds: hydrALAZINE, acetaminophen, albuterol sulfate HFA, sodium chloride flush, ondansetron, glucose, dextrose, glucagon (rDNA), dextrose      Intake/Output Summary (Last 24 hours) at 6/16/2019 1417  Last data filed at 6/16/2019 0711  Gross per 24 hour   Intake 2599.49 ml   Output 1525 ml   Net 1074.49 ml       Diet:  DIET CARB CONTROL; Exam:  BP (!) 192/81   Pulse 57   Temp 97.7 °F (36.5 °C) (Oral)   Resp 18   Ht 5' 4\" (1.626 m)   Wt (!) 317 lb (143.8 kg)   SpO2 94%   BMI 54.41 kg/m²     General appearance: No apparent distress, appears stated age and cooperative. HEENT: Pupils equal, round, and reactive to light. Conjunctivae/corneas clear. Neck: Supple, with full range of motion. No jugular venous distention. Trachea midline. Respiratory:  Normal respiratory effort. Clear to auscultation, bilaterally without Rales/Wheezes/Rhonchi. Cardiovascular: Regular rate and rhythm with normal S1/S2 without murmurs, rubs or gallops. Abdomen: Soft, non-tender, non-distended with normal bowel sounds. Musculoskeletal: passive and active ROM x 4 extremities. Skin: Skin color, texture, turgor normal.  No rashes or lesions. Neurologic:  Neurovascularly intact without any focal sensory/motor deficits. Cranial nerves: II-XII intact, grossly non-focal.  Psychiatric: Alert and oriented, thought content appropriate, normal insight  Capillary Refill: Brisk,< 3 seconds   Peripheral Pulses: +2 palpable, equal bilaterally       Labs:   No results for input(s): WBC, HGB, HCT, PLT in the last 72 hours.   Recent Labs     06/14/19  0342 06/15/19  0946 06/16/19  0359    144 142   K 4.1 4.0 3.6    105 106   CO2 22* 22* 24   BUN 48* 38* 34*   CREATININE 2.6* 2.1* 2.4*   CALCIUM 9.3 9.7 9.2     No results for input(s): AST, ALT, BILIDIR, BILITOT, ALKPHOS in the last 72 hours. No results for input(s): INR in the last 72 hours. No results for input(s): Larry Rosario in the last 72 hours. Microbiology:      Urinalysis:      Lab Results   Component Value Date    NITRU NEGATIVE 06/11/2019    WBCUA 5-10 06/11/2019    BACTERIA MANY 06/11/2019    RBCUA 0-2 06/11/2019    BLOODU NEGATIVE 06/11/2019    SPECGRAV 1.012 06/11/2019       Radiology:  US RENAL COMPLETE   Final Result   1. There is mild elevation of the left renal resistive index measuring 0.73. The sonographic appearance of the kidneys is otherwise within normal limits. 2. The urinary bladder is underdistended and contains low-level echoes. The urinary bladder is otherwise not adequately visualized or evaluated. Correlation should be made with a urinalysis. **This report has been created using voice recognition software. It may contain minor errors which are inherent in voice recognition technology. **      Final report electronically signed by Dr. Davide Fermin on 6/12/2019 12:55 PM        No results found.           Electronically signed by Howard Altamirano MD on 6/16/2019 at 2:17 PM

## 2019-06-17 LAB
ANION GAP SERPL CALCULATED.3IONS-SCNC: 13 MEQ/L (ref 8–16)
BUN BLDV-MCNC: 33 MG/DL (ref 7–22)
CALCIUM SERPL-MCNC: 9.1 MG/DL (ref 8.5–10.5)
CHLORIDE BLD-SCNC: 104 MEQ/L (ref 98–111)
CO2: 24 MEQ/L (ref 23–33)
CREAT SERPL-MCNC: 2.1 MG/DL (ref 0.4–1.2)
GFR SERPL CREATININE-BSD FRML MDRD: 24 ML/MIN/1.73M2
GLUCOSE BLD-MCNC: 119 MG/DL (ref 70–108)
GLUCOSE BLD-MCNC: 125 MG/DL (ref 70–108)
GLUCOSE BLD-MCNC: 138 MG/DL (ref 70–108)
GLUCOSE BLD-MCNC: 166 MG/DL (ref 70–108)
GLUCOSE BLD-MCNC: 96 MG/DL (ref 70–108)
POTASSIUM SERPL-SCNC: 4 MEQ/L (ref 3.5–5.2)
SODIUM BLD-SCNC: 141 MEQ/L (ref 135–145)

## 2019-06-17 PROCEDURE — 6370000000 HC RX 637 (ALT 250 FOR IP): Performed by: INTERNAL MEDICINE

## 2019-06-17 PROCEDURE — 6370000000 HC RX 637 (ALT 250 FOR IP): Performed by: PHYSICIAN ASSISTANT

## 2019-06-17 PROCEDURE — 80048 BASIC METABOLIC PNL TOTAL CA: CPT

## 2019-06-17 PROCEDURE — 99231 SBSQ HOSP IP/OBS SF/LOW 25: CPT | Performed by: INTERNAL MEDICINE

## 2019-06-17 PROCEDURE — 2580000003 HC RX 258: Performed by: INTERNAL MEDICINE

## 2019-06-17 PROCEDURE — 6360000002 HC RX W HCPCS: Performed by: INTERNAL MEDICINE

## 2019-06-17 PROCEDURE — 36415 COLL VENOUS BLD VENIPUNCTURE: CPT

## 2019-06-17 PROCEDURE — 1200000003 HC TELEMETRY R&B

## 2019-06-17 PROCEDURE — 99232 SBSQ HOSP IP/OBS MODERATE 35: CPT | Performed by: INTERNAL MEDICINE

## 2019-06-17 PROCEDURE — 82948 REAGENT STRIP/BLOOD GLUCOSE: CPT

## 2019-06-17 PROCEDURE — 94760 N-INVAS EAR/PLS OXIMETRY 1: CPT

## 2019-06-17 PROCEDURE — 94640 AIRWAY INHALATION TREATMENT: CPT

## 2019-06-17 RX ADMIN — Medication 2 PUFF: at 07:54

## 2019-06-17 RX ADMIN — HYDRALAZINE HYDROCHLORIDE 100 MG: 50 TABLET, FILM COATED ORAL at 13:40

## 2019-06-17 RX ADMIN — SODIUM BICARBONATE 650 MG: 650 TABLET ORAL at 19:58

## 2019-06-17 RX ADMIN — CETIRIZINE HYDROCHLORIDE 5 MG: 10 TABLET, FILM COATED ORAL at 09:03

## 2019-06-17 RX ADMIN — INSULIN LISPRO 1 UNITS: 100 INJECTION, SOLUTION INTRAVENOUS; SUBCUTANEOUS at 19:59

## 2019-06-17 RX ADMIN — AMLODIPINE BESYLATE 10 MG: 10 TABLET ORAL at 10:20

## 2019-06-17 RX ADMIN — ASPIRIN 81 MG: 81 TABLET ORAL at 19:59

## 2019-06-17 RX ADMIN — ENOXAPARIN SODIUM 40 MG: 40 INJECTION SUBCUTANEOUS at 23:00

## 2019-06-17 RX ADMIN — SERTRALINE 50 MG: 50 TABLET, FILM COATED ORAL at 19:58

## 2019-06-17 RX ADMIN — SODIUM CHLORIDE, POTASSIUM CHLORIDE, SODIUM LACTATE AND CALCIUM CHLORIDE: 600; 310; 30; 20 INJECTION, SOLUTION INTRAVENOUS at 15:10

## 2019-06-17 RX ADMIN — AMOXICILLIN AND CLAVULANATE POTASSIUM 1 TABLET: 500; 125 TABLET, FILM COATED ORAL at 09:03

## 2019-06-17 RX ADMIN — FAMOTIDINE 20 MG: 20 TABLET, FILM COATED ORAL at 09:04

## 2019-06-17 RX ADMIN — HYDRALAZINE HYDROCHLORIDE 100 MG: 50 TABLET, FILM COATED ORAL at 06:52

## 2019-06-17 RX ADMIN — HYDRALAZINE HYDROCHLORIDE 100 MG: 50 TABLET, FILM COATED ORAL at 21:00

## 2019-06-17 RX ADMIN — LEVOTHYROXINE SODIUM 50 MCG: 50 TABLET ORAL at 06:52

## 2019-06-17 RX ADMIN — Medication 2 PUFF: at 22:11

## 2019-06-17 RX ADMIN — SODIUM CHLORIDE, POTASSIUM CHLORIDE, SODIUM LACTATE AND CALCIUM CHLORIDE: 600; 310; 30; 20 INJECTION, SOLUTION INTRAVENOUS at 05:16

## 2019-06-17 RX ADMIN — AMOXICILLIN AND CLAVULANATE POTASSIUM 1 TABLET: 500; 125 TABLET, FILM COATED ORAL at 19:58

## 2019-06-17 RX ADMIN — SODIUM BICARBONATE 650 MG: 650 TABLET ORAL at 09:03

## 2019-06-17 ASSESSMENT — PAIN SCALES - GENERAL
PAINLEVEL_OUTOF10: 4
PAINLEVEL_OUTOF10: 0

## 2019-06-17 ASSESSMENT — PAIN DESCRIPTION - PAIN TYPE: TYPE: ACUTE PAIN

## 2019-06-17 ASSESSMENT — PAIN DESCRIPTION - LOCATION: LOCATION: HEAD

## 2019-06-17 NOTE — PLAN OF CARE
Care plan reviewed with patient. Patient verbalizes understanding of the plan of care and contribute to goal setting. Problem: Discharge Planning:  Goal: Participates in care planning  Description  Participates in care planning  Outcome: Ongoing  Note:   Patient aware and updated on plan of care       Problem: Discharge Planning:  Goal: Discharged to appropriate level of care  Description  Discharged to appropriate level of care  Outcome: Ongoing  Note:   Plan to discharge home with support     Problem: Skin Integrity - Impaired:  Goal: Will show no infection signs and symptoms  Description  Will show no infection signs and symptoms  Outcome: Ongoing  Note:   VSS, no fevers     Problem: Skin Integrity - Impaired:  Goal: Absence of new skin breakdown  Description  Absence of new skin breakdown  Outcome: Ongoing     Problem: Fluid Volume:  Goal: Ability to achieve a balanced intake and output will improve  Description  Ability to achieve a balanced intake and output will improve  Outcome: Ongoing  Note:   VSS, LR going at 100     Problem: Tissue Perfusion - Renal, Altered:  Goal: Electrolytes within specified parameters  Description  Electrolytes within specified parameters  Outcome: Ongoing  Note:   Electrolytes WNL      Problem: Tissue Perfusion - Renal, Altered:  Goal: Serum creatinine will be within specified parameters  Description  Serum creatinine will be within specified parameters  Outcome: Ongoing  Note:   Cr 2.1 today, down from 2.4 yesterday. Discharge tomorrow if below 2.0.  Nephrology following, LR at 100

## 2019-06-17 NOTE — PROGRESS NOTES
Renal Progress Note    Assessment and Plan:    1. Acute kidney injury progressively improving  2. Diabetes mellitus type 2  3. Hypertension primary  4. Deconditioning  PLAN:   Labs reviewed  Serum creatinine is improved  Medications reviewed  No changes  Labs in the morning  We will follow    Patient Active Problem List:     Essential hypertension     Type 2 diabetes mellitus without complication, without long-term current use of insulin (HCC)     Acquired hypothyroidism     Acute respiratory failure with hypoxia and hypercapnia (HCC)     Morbid obesity (HCC)     Pneumonia due to infectious organism     Stage 3 chronic kidney disease (HCC)     Asthma     Sleep apnea     GERD (gastroesophageal reflux disease)     Bradycardia     Obesity hypoventilation syndrome (HCC)     Bacteriuria     ZAK (acute kidney injury) ([facility] [address]     Metabolic acidosis      Subjective:   Admit Date: 6/11/2019    Interval History:   Seen for acute kidney injury  Very sleepy this morning  Updated by the staff  No issues  Stable blood pressure  Very good urine output      Medications:   Scheduled Meds:   sodium bicarbonate  650 mg Oral BID    hydrALAZINE  100 mg Oral 3 times per day    cetirizine  5 mg Oral Daily    amoxicillin-clavulanate  1 tablet Oral 2 times per day    famotidine  20 mg Oral Daily    amLODIPine  10 mg Oral Daily    aspirin  81 mg Oral Daily    mometasone-formoterol  2 puff Inhalation BID    levothyroxine  50 mcg Oral Daily    sertraline  50 mg Oral Daily    sodium chloride flush  10 mL Intravenous 2 times per day    enoxaparin  40 mg Subcutaneous Q24H    polyethylene glycol  17 g Oral Daily    insulin lispro  0-6 Units Subcutaneous TID WC    insulin lispro  0-3 Units Subcutaneous Nightly     Continuous Infusions:   lactated ringers 100 mL/hr at 06/17/19 0516    dextrose         CBC: No results for input(s): WBC, HGB, PLT in the last 72 hours.   CMP:    Recent Labs     06/15/19  0946 06/16/19  0359 06/17/19  0540    142 141   K 4.0 3.6 4.0    106 104   CO2 22* 24 24   BUN 38* 34* 33*   CREATININE 2.1* 2.4* 2.1*   GLUCOSE 147* 123* 125*   CALCIUM 9.7 9.2 9.1   LABGLOM 24* 20* 24*     Troponin: No results for input(s): TROPONINI in the last 72 hours. BNP: No results for input(s): BNP in the last 72 hours. INR: No results for input(s): INR in the last 72 hours. Lipids: No results for input(s): CHOL, LDLDIRECT, TRIG, HDL, AMYLASE, LIPASE in the last 72 hours. Liver: No results for input(s): AST, ALT, ALKPHOS, PROT, LABALBU, BILITOT in the last 72 hours. Invalid input(s): BILDIR  Iron:  No results for input(s): IRONS, FERRITIN in the last 72 hours. Invalid input(s): LABIRONS    Objective:   Vitals: /60   Pulse 51   Temp 98.1 °F (36.7 °C) (Oral)   Resp 18   Ht 5' 4\" (1.626 m)   Wt (!) 320 lb 8 oz (145.4 kg)   SpO2 94%   BMI 55.01 kg/m²    Wt Readings from Last 3 Encounters:   06/17/19 (!) 320 lb 8 oz (145.4 kg)   09/18/18 (!) 346 lb 3.2 oz (157 kg)   05/16/18 (!) 362 lb 3.2 oz (164.3 kg)      24HR INTAKE/OUTPUT:      Intake/Output Summary (Last 24 hours) at 6/17/2019 1149  Last data filed at 6/17/2019 0653  Gross per 24 hour   Intake 2875.68 ml   Output 2100 ml   Net 775.68 ml       Constitutional: Comfortably asleep  Skin:normal   HEENT:Pupils are reactive . Throat is clear   Neck:supple with no thyromegaly  Cardiovascular:  S1, S2 without murmur or rubs  Respiratory: Clear in the anterior  Abdomen: +bs, soft,   Ext: No LE edema  Musculoskeletal:Intact  Neuro: deferred      Electronically signed by Fernando Rouse MD on 6/17/2019 at 11:49 AM

## 2019-06-17 NOTE — PLAN OF CARE
Problem: RESPIRATORY  Goal: STG - patient can administer MDI's  Outcome: Ongoing  Note:   Pt continues on MDI for maintenance of asthma, to clear lung sounds/improve aeration and pt does a MDI at home.

## 2019-06-17 NOTE — CARE COORDINATION
6/17/19, 12:36 PM      Luis Daniel Gar day: 6  Location: [facility]08/008-A Reason for admit: Bradycardia [R00.1]      Treatment Plan of Care: creat 2.1 (was 2.7), nephrology following, IVF, Bicarb tablets, stop NSAIDS, afebrile, telemetry SB 51 - 70's, accu checks as ordered, Augmentin for UTI,       PCP: Mirian Aquino       Readmission Risk Score: 12%      Discharge Plan: plans home alone; declined .

## 2019-06-17 NOTE — PROGRESS NOTES
Pt admitted to 6K8 in a wheelchair from . Complaints: None. IV lactated Ringer's infusing into the antecubital left, condition patent and no redness at a rate of 100 mls/ hour with about 800 mls in the bag still. IV site free of s/s of infection or infiltration. Vital signs obtained. Assessment and data collection initiated. Two nurse skin assessment performed by Nikkie Del Rosario and Fartun Millan RN. Oriented to room. Policies and procedures for  explained. All questions answered with no further questions at this time. Fall prevention and safety brochure discussed with patient. Bed alarm on. Call light in reach. The best day to schedule a follow up Dr appointment is:  Thursday a.m.

## 2019-06-17 NOTE — PROGRESS NOTES
Hospitalist Progress Note      Patient:  Zulma Carrillo      Unit/Bed:6K-08/008-A    YOB: 1955    MRN: [id number]       Acct: [de-identified]     PCP: [address]    Date of Admission: 6/11/2019    Assessment/Plan:    · Acute kidney injury-likely secondary to the use of NSAID, diuretic also patient is bradycardic, which can lead to renal hypoperfusion-improving levels -nephrology following IV fluids ordered-kidney ultrasound changes of chronic kidney disease- hold all nephrotoxic agent-BMP a.m.-slowly improving however not at baseline-nephrology following    · Bradycardia-patient is not on beta-blockers however uses clonidine at home-however patient has obstructive sleep apnea and is noncompliant with her CPAP-we will order CPAP here-issues with major noncompliance-problem follow-up as outpatient    · Essential hypertension-holding clonidine, secondary to bradycardia -we will add hydralazine as needed for systolic blood pressures above 160-also add hydralazine oral-controlled on current regimen    · UTI E. coli growing -added Augmentin for 5 days continue  · Acute metabolic acidosis likely secondary to acute kidney injury-on IV fluid-and on bicarbonate tablet twice daily    · Type 2 diabetes mellitus with questionable chronic kidney disease related to diabetic nephrosclerosis-however her previous creatinine is normal-continue home medication for diabetes- -     · History of depression on Zoloft continue  · History of GERD was on Protonix at home however held secondary to acute kidney injury added famotidine instead  · Morbid obesity-lifestyle modification  · History of mild asthma-on Dulera at home continue  · History of hypothyroidism on Synthyroid-continue-TSH and free T4 checked in its normal    Will plan discharge if creatinine is less than 2  BMP in 2 weeks per nephrology as outpatient follow-up  Disposition per nephrology        Expected discharge date:  tbd depends on creatinine and blood pressures    Disposition:    [x] Home       [] TCU       [] Rehab       [] Psych       [] SNF       [] [place]       [] Other-    Chief Complaint:   Low heart rate    Initial H and P:-  ED  75 y.o. female who presented to [address] [place] [address] with \"low heart rate. The patient was in an outpatient center receiving a magnesium infusion when it was noted when checking her vital signs, but her heart rate was low in the high 30s. The patient denies a history of this. She states she has no history of heart disease. She states she has felt fatigued however denies dizziness, near syncope, syncope, chest pain, or palpitations. She states she receives frequent magnesium infusions for hypomagnesemia\"-per  note and confirmed by myself      Subjective (past 24 hours):   Blood pressures are controlled  Really wanting to go home  Denies any complaint    Past medical history, family history and social history reviewed again and is unchanged since admission.   Medications:  Reviewed    Infusion Medications    lactated ringers 100 mL/hr at 06/17/19 0516    dextrose       Scheduled Medications    sodium bicarbonate  650 mg Oral BID    hydrALAZINE  100 mg Oral 3 times per day    cetirizine  5 mg Oral Daily    amoxicillin-clavulanate  1 tablet Oral 2 times per day    famotidine  20 mg Oral Daily    amLODIPine  10 mg Oral Daily    aspirin  81 mg Oral Daily    mometasone-formoterol  2 puff Inhalation BID    levothyroxine  50 mcg Oral Daily    sertraline  50 mg Oral Daily    sodium chloride flush  10 mL Intravenous 2 times per day    enoxaparin  40 mg Subcutaneous Q24H    polyethylene glycol  17 g Oral Daily    insulin lispro  0-6 Units Subcutaneous TID     insulin lispro  0-3 Units Subcutaneous Nightly     PRN Meds: hydrALAZINE, acetaminophen, albuterol sulfate HFA, sodium chloride flush, ondansetron, glucose, dextrose, glucagon (rDNA), dextrose      Intake/Output Summary (Last 24 hours) at 6/17/2019 1002  Last data filed at 6/17/2019 0653  Gross per 24 hour   Intake 3135.68 ml   Output 2100 ml   Net 1035.68 ml       Diet:  DIET CARB CONTROL; Exam:  /61   Pulse 79   Temp 98.9 °F (37.2 °C) (Oral)   Resp 18   Ht 5' 4\" (1.626 m)   Wt (!) 320 lb 8 oz (145.4 kg)   SpO2 91%   BMI 55.01 kg/m²     General appearance: No apparent distress, appears stated age and cooperative. HEENT: Pupils equal, round, and reactive to light. Conjunctivae/corneas clear. Neck: Supple, with full range of motion. No jugular venous distention. Trachea midline. Respiratory:  Normal respiratory effort. Clear to auscultation, bilaterally without Rales/Wheezes/Rhonchi. Cardiovascular: Regular rate and rhythm with normal S1/S2 without murmurs, rubs or gallops. Abdomen: Soft, non-tender, non-distended with normal bowel sounds. Musculoskeletal: passive and active ROM x 4 extremities. Skin: Skin color, texture, turgor normal.  No rashes or lesions. Neurologic:  Neurovascularly intact without any focal sensory/motor deficits. Cranial nerves: II-XII intact, grossly non-focal.  Psychiatric: Alert and oriented, thought content appropriate, normal insight  Capillary Refill: Brisk,< 3 seconds   Peripheral Pulses: +2 palpable, equal bilaterally       Labs:   No results for input(s): WBC, HGB, HCT, PLT in the last 72 hours. Recent Labs     06/15/19  0946 06/16/19  0359 06/17/19  0540    142 141   K 4.0 3.6 4.0    106 104   CO2 22* 24 24   BUN 38* 34* 33*   CREATININE 2.1* 2.4* 2.1*   CALCIUM 9.7 9.2 9.1     No results for input(s): AST, ALT, BILIDIR, BILITOT, ALKPHOS in the last 72 hours. No results for input(s): INR in the last 72 hours. No results for input(s): Naveen Canela in the last 72 hours.     Microbiology:      Urinalysis:      Lab Results   Component Value Date    NITRU NEGATIVE 06/11/2019

## 2019-06-18 VITALS
HEART RATE: 65 BPM | BODY MASS INDEX: 50.02 KG/M2 | WEIGHT: 293 LBS | DIASTOLIC BLOOD PRESSURE: 65 MMHG | TEMPERATURE: 98 F | SYSTOLIC BLOOD PRESSURE: 143 MMHG | HEIGHT: 64 IN | OXYGEN SATURATION: 94 % | RESPIRATION RATE: 18 BRPM

## 2019-06-18 LAB
ANION GAP SERPL CALCULATED.3IONS-SCNC: 12 MEQ/L (ref 8–16)
BUN BLDV-MCNC: 30 MG/DL (ref 7–22)
CALCIUM SERPL-MCNC: 9.1 MG/DL (ref 8.5–10.5)
CHLORIDE BLD-SCNC: 108 MEQ/L (ref 98–111)
CO2: 24 MEQ/L (ref 23–33)
CREAT SERPL-MCNC: 2.3 MG/DL (ref 0.4–1.2)
EOSINOPHIL SMEAR: NORMAL
GFR SERPL CREATININE-BSD FRML MDRD: 21 ML/MIN/1.73M2
GLUCOSE BLD-MCNC: 111 MG/DL (ref 70–108)
GLUCOSE BLD-MCNC: 121 MG/DL (ref 70–108)
GLUCOSE BLD-MCNC: 141 MG/DL (ref 70–108)
MAGNESIUM: 1.6 MG/DL (ref 1.6–2.4)
POTASSIUM SERPL-SCNC: 3.6 MEQ/L (ref 3.5–5.2)
SODIUM BLD-SCNC: 144 MEQ/L (ref 135–145)
SPECIMEN: NORMAL

## 2019-06-18 PROCEDURE — 94640 AIRWAY INHALATION TREATMENT: CPT

## 2019-06-18 PROCEDURE — 6370000000 HC RX 637 (ALT 250 FOR IP): Performed by: INTERNAL MEDICINE

## 2019-06-18 PROCEDURE — 80048 BASIC METABOLIC PNL TOTAL CA: CPT

## 2019-06-18 PROCEDURE — 83735 ASSAY OF MAGNESIUM: CPT

## 2019-06-18 PROCEDURE — 6370000000 HC RX 637 (ALT 250 FOR IP): Performed by: PHYSICIAN ASSISTANT

## 2019-06-18 PROCEDURE — 2580000003 HC RX 258: Performed by: INTERNAL MEDICINE

## 2019-06-18 PROCEDURE — 36415 COLL VENOUS BLD VENIPUNCTURE: CPT

## 2019-06-18 PROCEDURE — 99232 SBSQ HOSP IP/OBS MODERATE 35: CPT | Performed by: INTERNAL MEDICINE

## 2019-06-18 PROCEDURE — 99239 HOSP IP/OBS DSCHRG MGMT >30: CPT | Performed by: INTERNAL MEDICINE

## 2019-06-18 PROCEDURE — 89190 NASAL SMEAR FOR EOSINOPHILS: CPT

## 2019-06-18 PROCEDURE — 82948 REAGENT STRIP/BLOOD GLUCOSE: CPT

## 2019-06-18 RX ORDER — HYDRALAZINE HYDROCHLORIDE 100 MG/1
100 TABLET, FILM COATED ORAL EVERY 8 HOURS SCHEDULED
Qty: 90 TABLET | Refills: 3 | Status: SHIPPED | OUTPATIENT
Start: 2019-06-18

## 2019-06-18 RX ORDER — SODIUM BICARBONATE 650 MG/1
650 TABLET ORAL 2 TIMES DAILY
Qty: 60 TABLET | Refills: 0 | Status: SHIPPED | OUTPATIENT
Start: 2019-06-18

## 2019-06-18 RX ADMIN — CETIRIZINE HYDROCHLORIDE 5 MG: 10 TABLET, FILM COATED ORAL at 09:18

## 2019-06-18 RX ADMIN — FAMOTIDINE 20 MG: 20 TABLET, FILM COATED ORAL at 09:18

## 2019-06-18 RX ADMIN — AMLODIPINE BESYLATE 10 MG: 10 TABLET ORAL at 09:18

## 2019-06-18 RX ADMIN — HYDRALAZINE HYDROCHLORIDE 100 MG: 50 TABLET, FILM COATED ORAL at 05:29

## 2019-06-18 RX ADMIN — Medication 2 PUFF: at 10:15

## 2019-06-18 RX ADMIN — SODIUM BICARBONATE 650 MG: 650 TABLET ORAL at 09:18

## 2019-06-18 RX ADMIN — SODIUM CHLORIDE, POTASSIUM CHLORIDE, SODIUM LACTATE AND CALCIUM CHLORIDE: 600; 310; 30; 20 INJECTION, SOLUTION INTRAVENOUS at 00:10

## 2019-06-18 RX ADMIN — LEVOTHYROXINE SODIUM 50 MCG: 50 TABLET ORAL at 05:29

## 2019-06-18 ASSESSMENT — PAIN SCALES - GENERAL
PAINLEVEL_OUTOF10: 0
PAINLEVEL_OUTOF10: 0

## 2019-06-18 NOTE — PLAN OF CARE
Problem: Discharge Planning:  Goal: Participates in care planning  Description  Participates in care planning  6/18/2019 0220 by Alesha Gutierrez RN  Outcome: Ongoing  Note:   Pt is active in care planning. Problem: Discharge Planning:  Goal: Discharged to appropriate level of care  Description  Discharged to appropriate level of care  6/18/2019 0220 by Alesha Gutierrez RN  Outcome: Ongoing  Note:   When discharged pt plans to return home alone. Problem: Skin Integrity - Impaired:  Goal: Will show no infection signs and symptoms  Description  Will show no infection signs and symptoms  6/18/2019 0220 by Alesha Gutierrez RN  Outcome: Ongoing  Note:   Labs being monitored. Will continue to reassess. Problem: Skin Integrity - Impaired:  Goal: Absence of new skin breakdown  Description  Absence of new skin breakdown  6/18/2019 0220 by Alesha Gutierrez RN  Outcome: Ongoing  Note:   No new problem areas noted to skin. Pt repositions frequently to prevent skin breakdown. Problem: Tissue Perfusion - Renal, Altered:  Goal: Electrolytes within specified parameters  Description  Electrolytes within specified parameters  6/18/2019 0220 by Alesha Gutierrez RN  Outcome: Ongoing  Note:   Monitoring labs. Problem: Tissue Perfusion - Renal, Altered:  Goal: Urine creatinine clearance will be within specified parameters  Description  Urine creatinine clearance will be within specified parameters  6/18/2019 0220 by Alesha Gutierrez RN  Outcome: Ongoing  Note:   Monitoring creat. Today 2.1   Care plan reviewed with patient. Patient verbalizes understanding of the plan of care and contribute to goal setting.

## 2019-06-18 NOTE — PROGRESS NOTES
CLINICAL PHARMACY: DISCHARGE MED RECONCILIATION/REVIEW    [facility]) Select Patient?: No  Total # of Interventions Recommended: 0   -   Total # Interventions Accepted: 0  Intervention Severity:   - Level 1 Intervention Present?: No   - Level 2 #: 0   - Level 3 #: 0   Time Spent (min): 15    Debby Lord PharmD 6/18/2019 11:27 AM

## 2019-06-18 NOTE — PROGRESS NOTES
Discharge teaching and instructions for diagnosis/procedure of bradycardia completed with patient using teachback method. AVS reviewed. Printed prescriptions given to patient. Patient voiced understanding regarding prescriptions, follow up appointments, and care of self at home.  Discharged ambulatory and in a wheelchair to  home with support per friend

## 2019-06-18 NOTE — PROGRESS NOTES
lispro  0-6 Units Subcutaneous TID     insulin lispro  0-3 Units Subcutaneous Nightly     Continuous Infusions:   lactated ringers 100 mL/hr at 06/18/19 0010    dextrose         CBC: No results for input(s): WBC, HGB, PLT in the last 72 hours. CMP:    Recent Labs     06/16/19  0359 06/17/19  0540 06/18/19  0525    141 144   K 3.6 4.0 3.6    104 108   CO2 24 24 24   BUN 34* 33* 30*   CREATININE 2.4* 2.1* 2.3*   GLUCOSE 123* 125* 121*   CALCIUM 9.2 9.1 9.1   LABGLOM 20* 24* 21*     Troponin: No results for input(s): TROPONINI in the last 72 hours. BNP: No results for input(s): BNP in the last 72 hours. INR: No results for input(s): INR in the last 72 hours. Lipids: No results for input(s): CHOL, LDLDIRECT, TRIG, HDL, AMYLASE, LIPASE in the last 72 hours. Liver: No results for input(s): AST, ALT, ALKPHOS, PROT, LABALBU, BILITOT in the last 72 hours. Invalid input(s): BILDIR  Iron:  No results for input(s): IRONS, FERRITIN in the last 72 hours. Invalid input(s): LABIRONS    Objective:   Vitals: BP (!) 164/81   Pulse 64   Temp 97.9 °F (36.6 °C) (Oral)   Resp 18   Ht 5' 4\" (1.626 m)   Wt (!) 323 lb 11.2 oz (146.8 kg)   SpO2 94%   BMI 55.56 kg/m²    Wt Readings from Last 3 Encounters:   06/18/19 (!) 323 lb 11.2 oz (146.8 kg)   09/18/18 (!) 346 lb 3.2 oz (157 kg)   05/16/18 (!) 362 lb 3.2 oz (164.3 kg)      24HR INTAKE/OUTPUT:      Intake/Output Summary (Last 24 hours) at 6/18/2019 0641  Last data filed at 6/18/2019 0336  Gross per 24 hour   Intake 4112.12 ml   Output 1750 ml   Net 2362.12 ml       Constitutional: Awake and alert with  No complaint   Skin:normal   HEENT:Pupils are reactive . Throat is clear   Neck:supple with no thyromegaly  Cardiovascular:  S1, S2 without murmur or rubs  Respiratory: Clear in the anterior  Abdomen: +bs, soft, non tender   Ext: No LE edema  Musculoskeletal:Intact  Neuro: Awake and alert ,well oriented ,normal speech with no deficit       Electronically signed by Khushi Ness MD on 6/18/2019 at 6:41 AM

## 2019-06-18 NOTE — DISCHARGE SUMMARY
Hospitalist Progress Note      Patient:  Cesia Licona      Unit/Bed:6K-08/008-A    YOB: 1955    MRN: [id number]       Acct: [de-identified]     PCP: [address]    Date of Admission: 6/11/2019    Assessment/Plan:    · Acute kidney injury-likely secondary to the use of NSAID, diuretic also patient is bradycardic, which can lead to renal hypoperfusion-improving levels -nephrology following IV fluids ordered-kidney ultrasound changes of chronic kidney disease- hold all nephrotoxic agent-BMP a.m.-slowly improving however not at baseline-nephrology following-okay for discharge from the standpoint-BMP given as follow-up -we will continue to hold nephrotoxic agents at discharge-nephrology follow-up appointment given    · Bradycardia-patient is not on beta-blockers however uses clonidine at home.  also patient has obstructive sleep apnea and is noncompliant with her CPAP-we will order CPAP here-issues with major noncompliance-problem follow-up as outpatient-    · Essential hypertension-holding clonidine, secondary to bradycardia -we will add hydralazine as needed for systolic blood pressures above 160-also add hydralazine oral-controlled on current regimen    · UTI E. coli growing -added Augmentin for 5 days continue-Augmentin course completed prior to discharge  · Acute metabolic acidosis likely secondary to acute kidney injury-on IV fluid-and on bicarbonate tablet twice daily-    · Type 2 diabetes mellitus with questionable chronic kidney disease related to diabetic nephrosclerosis-however her previous creatinine is normal-continue home medication for diabetes- -     · History of depression on Zoloft continue  · History of GERD was on Protonix at home however held secondary to acute kidney injury added famotidine instead  · Morbid obesity-lifestyle modification  · History of mild asthma-on Dulera at home continue  · History of hypothyroidism on Synthyroid-continue-TSH and free T4 checked in its normal  Okay for discharge per nephrology with BMP follow-up as outpatient given by nephrologist  Continue to avoid nephrotoxic agents  PCP follow-up in 1 week  Nephrology follow-up scheduled as outpatient  Stable for discharge today    Discharge time 35 minutes    Chief Complaint:   Low heart rate    Initial H and P:-  ED  75 y.o. female who presented to [address] [place] [address] with \"low heart rate. The patient was in an outpatient center receiving a magnesium infusion when it was noted when checking her vital signs, but her heart rate was low in the high 30s. The patient denies a history of this. She states she has no history of heart disease. She states she has felt fatigued however denies dizziness, near syncope, syncope, chest pain, or palpitations. She states she receives frequent magnesium infusions for hypomagnesemia\"-per  note and confirmed by myself      An After Visit Summary was printed and given to the patient.      Medication List      START taking these medications    hydrALAZINE 100 MG tablet  Commonly known as:  APRESOLINE  Take 1 tablet by mouth every 8 hours     sodium bicarbonate 650 MG tablet  Take 1 tablet by mouth 2 times daily        CONTINUE taking these medications    albuterol sulfate  (90 Base) MCG/ACT inhaler     amLODIPine 10 MG tablet  Commonly known as:  NORVASC  Take 1 tablet by mouth daily     aspirin 81 MG tablet     benzonatate 100 MG capsule  Commonly known as:  TESSALON     fluticasone-salmeterol 250-50 MCG/DOSE Aepb  Commonly known as:  ADVAIR     glipiZIDE 5 MG tablet  Commonly known as:  GLUCOTROL     levothyroxine 50 MCG tablet  Commonly known as:  SYNTHROID  Take 1 tablet by mouth Daily     pantoprazole 40 MG tablet  Commonly known as:  PROTONIX     sertraline 50 MG tablet  Commonly known as:  ZOLOFT        STOP taking these medications    acetaminophen 325 MG tablet  Commonly

## 2019-06-18 NOTE — FLOWSHEET NOTE
Pt is a 71yo woman. Pt shared that she is a member of the [employer] in [place], but she primarily watches TV preachers. Pt became tearful when discussing the death of her  of 28 year, 15 years ago. She and her  never had any children of their own, but she helped raise several children. Including one who she considers herself the grandmother of his children. Spiritual care to continue to explore pt's grief and pain as well as how she finds meaning and purpose in life. 06/17/19 2015   Encounter Summary   Services provided to: Patient   Referral/Consult From: [address] [place] [address] Family members;Friends/neighbors; Voodoo/adrianne community   Place of Anab[facility]ist   ([facility])   Continue Visiting Yes  (6/17)   Complexity of Encounter Moderate   Length of Encounter 15 minutes   Spiritual/Church   Type Spiritual support   Assessment Approachable;Tearful   Intervention Active listening;Explored feelings, thoughts, concerns;Explored coping resources;Sustaining presence/ Ministry of presence   Outcome Comfort;Engaged in conversation;Expressed feelings/needs/concerns

## 2019-07-01 LAB
BUN BLDV-MCNC: 30 MG/DL
CALCIUM SERPL-MCNC: 9.3 MG/DL
CHLORIDE BLD-SCNC: 104 MMOL/L
CO2: 26 MMOL/L
CREAT SERPL-MCNC: 2.25 MG/DL
GFR CALCULATED: 22
GLUCOSE BLD-MCNC: 139 MG/DL
POTASSIUM SERPL-SCNC: 4 MMOL/L
SODIUM BLD-SCNC: 141 MMOL/L

## 2019-07-20 NOTE — PROCEDURES
[address] [id number]                                 EVENT MONITOR    PATIENT NAME: Matthew Avila                   :        1955  MED REC NO:   [id number]                           ROOM:       0008  ACCOUNT NO:   [de-identified]                           ADMIT DATE: 2019  PROVIDER:     Love Oro M.D.    TEST TYPE:  Event monitor. CLINICAL HISTORY AND INDICATION:  This is a patient with bradycardia. EVENT MONITOR DESCRIPTION:  Event monitor was attached between  2019 to 2019. EVENT MONITOR FINDINGS:  Baseline rhythm showed sinus rhythm. Pretty  much unremarkable event monitor with no significant findings. CONCLUSION:  Unremarkable event monitor with no significant findings.         Tiara Lopez M.D.    D: 2019 11:21:12       T: 2019 11:31:09     [email or web address]  Job#: [id number]     Doc#: [id number]    CC:

## 2019-09-19 ENCOUNTER — TELEPHONE (OUTPATIENT)
Dept: CARDIOLOGY CLINIC | Age: 64
End: 2019-09-19

## 2019-09-19 NOTE — TELEPHONE ENCOUNTER
09/19/2019 Patient called office saying she say her pcp about some heart symptoms and he advised her to call Dr. Malcolm Braswell. Patient stated she has been having head pressure, chest pains off and on. LDOV 09/18/2018. Patient said she did not have symptoms while talking to me on phone. Patient said she would see Dr. Malcolm Braswell only. Gave 1st available 12/09/2019. Please advise patient if she can be seen sooner at [phone]. da

## 2019-10-16 ENCOUNTER — OFFICE VISIT (OUTPATIENT)
Dept: CARDIOLOGY CLINIC | Age: 64
End: 2019-10-16
Payer: MEDICARE

## 2019-10-16 ENCOUNTER — TELEPHONE (OUTPATIENT)
Dept: CARDIOLOGY CLINIC | Age: 64
End: 2019-10-16

## 2019-10-16 VITALS
SYSTOLIC BLOOD PRESSURE: 138 MMHG | HEART RATE: 72 BPM | BODY MASS INDEX: 50.02 KG/M2 | DIASTOLIC BLOOD PRESSURE: 78 MMHG | WEIGHT: 293 LBS | HEIGHT: 64 IN

## 2019-10-16 DIAGNOSIS — R07.89 CHEST PRESSURE: Primary | ICD-10-CM

## 2019-10-16 PROBLEM — L71.9 ROSACEA: Status: ACTIVE | Noted: 2017-04-13

## 2019-10-16 PROBLEM — E55.9 VITAMIN D DEFICIENCY: Status: ACTIVE | Noted: 2017-03-16

## 2019-10-16 PROBLEM — K43.9 VENTRAL HERNIA WITHOUT OBSTRUCTION OR GANGRENE: Status: ACTIVE | Noted: 2018-01-18

## 2019-10-16 PROBLEM — I83.019 VENOUS STASIS ULCER OF RIGHT LOWER EXTREMITY (HCC): Status: ACTIVE | Noted: 2017-05-11

## 2019-10-16 PROBLEM — L97.919 VENOUS STASIS ULCER OF RIGHT LOWER EXTREMITY (HCC): Status: ACTIVE | Noted: 2017-05-11

## 2019-10-16 PROBLEM — F41.8 OTHER SPECIFIED ANXIETY DISORDERS: Status: ACTIVE | Noted: 2018-05-01

## 2019-10-16 PROBLEM — E83.41 HYPERMAGNESEMIA: Status: ACTIVE | Noted: 2019-10-16

## 2019-10-16 PROBLEM — K86.2 PANCREATIC CYST: Status: ACTIVE | Noted: 2017-03-16

## 2019-10-16 PROBLEM — J44.9 CHRONIC OBSTRUCTIVE PULMONARY DISEASE (HCC): Status: ACTIVE | Noted: 2018-04-12

## 2019-10-16 PROBLEM — R91.8 LUNG NODULE, MULTIPLE: Status: ACTIVE | Noted: 2018-06-02

## 2019-10-16 PROBLEM — M17.0 PRIMARY OSTEOARTHRITIS OF BOTH KNEES: Status: ACTIVE | Noted: 2019-04-18

## 2019-10-16 PROBLEM — R06.2 WHEEZING: Status: ACTIVE | Noted: 2019-10-16

## 2019-10-16 PROBLEM — R60.9 BODY FLUID RETENTION: Status: ACTIVE | Noted: 2019-10-16

## 2019-10-16 PROBLEM — I50.9 CONGESTIVE HEART FAILURE (HCC): Status: ACTIVE | Noted: 2019-10-16

## 2019-10-16 PROBLEM — R05.9 COUGH: Status: ACTIVE | Noted: 2019-03-10

## 2019-10-16 PROBLEM — J40 BRONCHITIS: Status: ACTIVE | Noted: 2018-04-12

## 2019-10-16 PROBLEM — N30.00 ACUTE CYSTITIS: Status: ACTIVE | Noted: 2019-10-16

## 2019-10-16 PROBLEM — I87.2 VENOUS INSUFFICIENCY (CHRONIC) (PERIPHERAL): Status: ACTIVE | Noted: 2017-05-11

## 2019-10-16 PROBLEM — F34.1 DYSTHYMIA: Status: ACTIVE | Noted: 2017-06-29

## 2019-10-16 PROBLEM — H65.92: Status: ACTIVE | Noted: 2018-07-16

## 2019-10-16 PROBLEM — R60.0 EDEMA OF LOWER EXTREMITY: Status: ACTIVE | Noted: 2019-10-16

## 2019-10-16 PROBLEM — L72.0 EPIDERMOID CYST OF SKIN: Status: ACTIVE | Noted: 2018-01-18

## 2019-10-16 PROCEDURE — 99204 OFFICE O/P NEW MOD 45 MIN: CPT | Performed by: INTERNAL MEDICINE

## 2019-10-16 RX ORDER — FUROSEMIDE 40 MG/1
40 TABLET ORAL 2 TIMES DAILY
Qty: 60 TABLET | Refills: 3 | Status: SHIPPED | OUTPATIENT
Start: 2019-10-16 | End: 2020-07-27

## 2019-10-16 RX ORDER — CLONIDINE HYDROCHLORIDE 0.1 MG/1
0.1 TABLET ORAL 2 TIMES DAILY
COMMUNITY

## 2019-10-16 RX ORDER — HYDROCHLOROTHIAZIDE 50 MG/1
25 TABLET ORAL 2 TIMES DAILY
COMMUNITY
End: 2020-07-27

## 2019-10-16 RX ORDER — ACYCLOVIR 50 MG/G
OINTMENT TOPICAL
COMMUNITY

## 2019-10-16 RX ORDER — ACETAMINOPHEN 325 MG/1
650 TABLET ORAL EVERY 8 HOURS PRN
COMMUNITY

## 2019-10-16 RX ORDER — ISOSORBIDE MONONITRATE 30 MG/1
30 TABLET, EXTENDED RELEASE ORAL DAILY
Qty: 30 TABLET | Refills: 3 | Status: SHIPPED | OUTPATIENT
Start: 2019-10-16 | End: 2020-07-27

## 2019-11-04 ENCOUNTER — TELEPHONE (OUTPATIENT)
Dept: CARDIOLOGY CLINIC | Age: 64
End: 2019-11-04

## 2019-11-15 PROBLEM — R05.9 COUGH: Status: RESOLVED | Noted: 2019-03-10 | Resolved: 2019-11-15

## 2020-04-15 ENCOUNTER — TELEPHONE (OUTPATIENT)
Dept: CARDIOLOGY CLINIC | Age: 65
End: 2020-04-15

## 2020-07-21 ENCOUNTER — TELEPHONE (OUTPATIENT)
Dept: CARDIOLOGY CLINIC | Age: 65
End: 2020-07-21

## 2020-07-21 NOTE — TELEPHONE ENCOUNTER
Received referral for pt to be seen for possible pacemaker due to chronic conditions, holter monitor showed bradycardia, and SOB. Called pt to make an appt with Dr. Rickey Em, left message on machine to call us back.

## 2020-07-27 ENCOUNTER — OFFICE VISIT (OUTPATIENT)
Dept: CARDIOLOGY CLINIC | Age: 65
End: 2020-07-27
Payer: MEDICARE

## 2020-07-27 VITALS
HEART RATE: 54 BPM | DIASTOLIC BLOOD PRESSURE: 70 MMHG | SYSTOLIC BLOOD PRESSURE: 134 MMHG | HEIGHT: 64 IN | WEIGHT: 293 LBS | BODY MASS INDEX: 50.02 KG/M2

## 2020-07-27 PROCEDURE — 1036F TOBACCO NON-USER: CPT | Performed by: NUCLEAR MEDICINE

## 2020-07-27 PROCEDURE — G8400 PT W/DXA NO RESULTS DOC: HCPCS | Performed by: NUCLEAR MEDICINE

## 2020-07-27 PROCEDURE — G8427 DOCREV CUR MEDS BY ELIG CLIN: HCPCS | Performed by: NUCLEAR MEDICINE

## 2020-07-27 PROCEDURE — 1090F PRES/ABSN URINE INCON ASSESS: CPT | Performed by: NUCLEAR MEDICINE

## 2020-07-27 PROCEDURE — 4040F PNEUMOC VAC/ADMIN/RCVD: CPT | Performed by: NUCLEAR MEDICINE

## 2020-07-27 PROCEDURE — G8417 CALC BMI ABV UP PARAM F/U: HCPCS | Performed by: NUCLEAR MEDICINE

## 2020-07-27 PROCEDURE — 3017F COLORECTAL CA SCREEN DOC REV: CPT | Performed by: NUCLEAR MEDICINE

## 2020-07-27 PROCEDURE — 1123F ACP DISCUSS/DSCN MKR DOCD: CPT | Performed by: NUCLEAR MEDICINE

## 2020-07-27 PROCEDURE — 99214 OFFICE O/P EST MOD 30 MIN: CPT | Performed by: NUCLEAR MEDICINE

## 2020-07-27 RX ORDER — CLONAZEPAM 1 MG/1
1 TABLET ORAL NIGHTLY PRN
COMMUNITY

## 2020-07-27 RX ORDER — CEFDINIR 300 MG/1
300 CAPSULE ORAL 2 TIMES DAILY
COMMUNITY

## 2020-07-27 RX ORDER — MAGNESIUM OXIDE 400 MG/1
400 TABLET ORAL DAILY
COMMUNITY

## 2020-07-27 RX ORDER — DULAGLUTIDE 0.75 MG/.5ML
0.75 INJECTION, SOLUTION SUBCUTANEOUS WEEKLY
COMMUNITY

## 2020-07-27 NOTE — PROGRESS NOTES
[address] [facility],[place] [id number]  Dept: [phone]  Dept Fax: [phone]  Loc: [phone]    Visit Date: 7/27/2020    Betty Earl is a 77 y.o. female who presents todayfor:  Chief Complaint   Patient presents with    New Patient    Shortness of Breath    Bradycardia    Hypertension   here for the first time  Used to see Mireya Clark   Severe obesity   Lately seen PCP   Had a holter  Showed bradycardia  Here for opinion   No syncope  Some dizziness  Event monitor last year was okay   Extreme obesity   Does have sleep apnea  Does have DM with so so control   No known CAD before  BP seems stable           HPI:  HPI  Past Medical History:   Diagnosis Date    Asthma     Blood circulation, collateral     COPD (chronic obstructive pulmonary disease) ([address]     COPD (chronic obstructive pulmonary disease) (HCC)     Fibromyalgia     Hyperlipidemia     Hypertension     Hypothyroidism     Pickwickian syndrome ([facility] [address]     Psychiatric problem     Anxiety    Restless leg     Sleep apnea     Type 2 diabetes mellitus without complication ([facility] [address]       Past Surgical History:   Procedure Laterality Date    APPENDECTOMY      CATARACT REMOVAL      CHOLECYSTECTOMY       Family History   Problem Relation Age of Onset    Diabetes Mother     Heart Disease Mother     Diabetes Father     Diabetes Brother      Social History     Tobacco Use    Smoking status: Never Smoker    Smokeless tobacco: Never Used   Substance Use Topics    Alcohol use: No      Current Outpatient Medications   Medication Sig Dispense Refill    cefdinir (OMNICEF) 300 MG capsule Take 300 mg by mouth 2 times daily x1 week      clonazePAM (KLONOPIN) 1 MG tablet Take 1 mg by mouth nightly as needed.       magnesium oxide (MAG-OX) 400 MG tablet Take 400 mg by mouth daily Take 800mg at HS      Dulaglutide (TRULICITY) 2.53 ZM/4.0BN SOPN Inject 0.75 mg into the skin once test (Diabetic or Prediabetic)  06/11/2020    TSH testing  06/11/2020    Potassium monitoring  07/01/2020    Creatinine monitoring  07/01/2020    Flu vaccine (1) 09/01/2020    Hepatitis A vaccine  Aged Out    Hib vaccine  Aged Out    Meningococcal (ACWY) vaccine  Aged Out       Subjective:  Review of Systems  General:   No fever, no chills, some fatigue or weight loss  Pulmonary:    some dyspnea, no wheezing  Cardiac:    Denies recent chest pain,   GI:     No nausea or vomiting, no abdominal pain  Neuro:    No dizziness or light headedness,   Musculoskeletal:  No recent active issues  Extremities:   No edema, no obvious claudication       Objective:  Physical Exam  /70   Pulse 54   Ht 5' 4\" (1.626 m)   Wt (!) 375 lb (170.1 kg)   BMI 64.37 kg/m²   General:   Well developed, well nourished  Lungs:   Clear to auscultation  Heart:    Normal S1 S2, Slight murmur. no rubs, no gallops  Abdomen:   Soft, non tender, no organomegalies, positive bowel sounds  Extremities:   No edema, no cyanosis, good peripheral pulses  Neurological:   Awake, alert, oriented. No obvious focal deficits  Musculoskelatal:  No obvious deformities    Assessment:      Diagnosis Orders   1. Shortness of breath     2. Essential hypertension     3. Bradycardia     extreme limitation with weight   Possible underlying CAD  Bradycardia is possibly due to sleep apnea      Plan:  No follow-ups on file. Need holter report from [facility]  She is reluctant about a stress test   Might need an event monitor   Continue risk factor modification and medical management. ty    Consider an event monitor   Orders Placed:  No orders of the defined types were placed in this encounter. Medications Prescribed:  No orders of the defined types were placed in this encounter. Discussed use, benefit, and side effects of prescribed medications. All patient questions answered. Pt voicedunderstanding.  Instructed to continue current medications, diet and

## 2020-07-28 ENCOUNTER — TELEPHONE (OUTPATIENT)
Dept: CARDIOLOGY CLINIC | Age: 65
End: 2020-07-28

## 2020-07-28 NOTE — TELEPHONE ENCOUNTER
Patient was in office yesterday. You requested the holter results from [facility]. Records received. Please review. Anything needed?

## 2020-11-17 ENCOUNTER — TELEPHONE (OUTPATIENT)
Dept: BARIATRICS/WEIGHT MGMT | Age: 65
End: 2020-11-17

## 2020-11-17 NOTE — TELEPHONE ENCOUNTER
Referral received for Dr Re Puente [facility]. Contacted pt to view online seminar and will contact pt for appointment after completion of online seminar.

## 2021-02-23 ENCOUNTER — OFFICE VISIT (OUTPATIENT)
Dept: CARDIOLOGY CLINIC | Age: 66
End: 2021-02-23
Payer: MEDICARE

## 2021-02-23 VITALS
HEIGHT: 64 IN | SYSTOLIC BLOOD PRESSURE: 155 MMHG | DIASTOLIC BLOOD PRESSURE: 62 MMHG | BODY MASS INDEX: 50.02 KG/M2 | HEART RATE: 69 BPM | WEIGHT: 293 LBS

## 2021-02-23 DIAGNOSIS — R06.02 SHORTNESS OF BREATH: Primary | ICD-10-CM

## 2021-02-23 DIAGNOSIS — R00.1 BRADYCARDIA: ICD-10-CM

## 2021-02-23 PROCEDURE — 1123F ACP DISCUSS/DSCN MKR DOCD: CPT | Performed by: NUCLEAR MEDICINE

## 2021-02-23 PROCEDURE — G8427 DOCREV CUR MEDS BY ELIG CLIN: HCPCS | Performed by: NUCLEAR MEDICINE

## 2021-02-23 PROCEDURE — 1036F TOBACCO NON-USER: CPT | Performed by: NUCLEAR MEDICINE

## 2021-02-23 PROCEDURE — G8400 PT W/DXA NO RESULTS DOC: HCPCS | Performed by: NUCLEAR MEDICINE

## 2021-02-23 PROCEDURE — 1090F PRES/ABSN URINE INCON ASSESS: CPT | Performed by: NUCLEAR MEDICINE

## 2021-02-23 PROCEDURE — 3017F COLORECTAL CA SCREEN DOC REV: CPT | Performed by: NUCLEAR MEDICINE

## 2021-02-23 PROCEDURE — G8417 CALC BMI ABV UP PARAM F/U: HCPCS | Performed by: NUCLEAR MEDICINE

## 2021-02-23 PROCEDURE — 99213 OFFICE O/P EST LOW 20 MIN: CPT | Performed by: NUCLEAR MEDICINE

## 2021-02-23 PROCEDURE — G8484 FLU IMMUNIZE NO ADMIN: HCPCS | Performed by: NUCLEAR MEDICINE

## 2021-02-23 PROCEDURE — 4040F PNEUMOC VAC/ADMIN/RCVD: CPT | Performed by: NUCLEAR MEDICINE

## 2021-02-23 NOTE — PROGRESS NOTES
[address] [place] [address] [address] [facility],[address] [place] [address]  Dept: [phone]  Dept Fax: [phone]  Loc: [phone]    Visit Date: 2/23/2021    Mary Jean is a 77 y.o. female who presents todayfor:  Chief Complaint   Patient presents with    Check-Up    Bradycardia    Shortness of Breath   very limited patient  Severe obesity   Seen for bradycardia   holter was okay   No indication for a pacer  No chest pain   No changes in breathing  Sleep apnea  On CPAP   Bp is stable  No known CAd       HPI:  HPI  Past Medical History:   Diagnosis Date    Asthma     Blood circulation, collateral     COPD (chronic obstructive pulmonary disease) (HCC)     COPD (chronic obstructive pulmonary disease) (HCC)     Fibromyalgia     Hyperlipidemia     Hypertension     Hypothyroidism     Pickwickian syndrome ([facility] [facility]     Psychiatric problem     Anxiety    Restless leg     Sleep apnea     Type 2 diabetes mellitus without complication ([facility] [address]       Past Surgical History:   Procedure Laterality Date    APPENDECTOMY      CATARACT REMOVAL      CHOLECYSTECTOMY       Family History   Problem Relation Age of Onset    Diabetes Mother     Heart Disease Mother     Diabetes Father     Diabetes Brother      Social History     Tobacco Use    Smoking status: Never Smoker    Smokeless tobacco: Never Used   Substance Use Topics    Alcohol use: No      Current Outpatient Medications   Medication Sig Dispense Refill    cefdinir (OMNICEF) 300 MG capsule Take 300 mg by mouth 2 times daily x1 week      clonazePAM (KLONOPIN) 1 MG tablet Take 1 mg by mouth nightly as needed.       magnesium oxide (MAG-OX) 400 MG tablet Take 400 mg by mouth daily Take 800mg at HS      Dulaglutide (TRULICITY) 9.39 MY/2.7SN SOPN Inject 0.75 mg into the skin once a week      acetaminophen (TYLENOL) 325 MG tablet Take 650 mg by mouth every 8 hours as needed for Pain  acyclovir (ZOVIRAX) 5 % ointment Apply topically every 3 hours Apply topically every 3 hours.  cloNIDine (CATAPRES) 0.1 MG tablet Take 0.1 mg by mouth 2 times daily Take 0.5 mg by mouth 2x daily      hydrALAZINE (APRESOLINE) 100 MG tablet Take 1 tablet by mouth every 8 hours (Patient taking differently: Take 25 mg by mouth every 8 hours 1 tab 2x daily) 90 tablet 3    sodium bicarbonate 650 MG tablet Take 1 tablet by mouth 2 times daily 60 tablet 0    glipiZIDE (GLUCOTROL) 5 MG tablet Take 5 mg by mouth 2 times daily Only takes if sugar is >150       albuterol sulfate  (90 Base) MCG/ACT inhaler Inhale 2 puffs into the lungs every 6 hours as needed for Wheezing      amLODIPine (NORVASC) 10 MG tablet Take 1 tablet by mouth daily 30 tablet 3    levothyroxine (SYNTHROID) 50 MCG tablet Take 1 tablet by mouth Daily 30 tablet 5    pantoprazole (PROTONIX) 40 MG tablet Take 40 mg by mouth daily      aspirin 81 MG tablet Take 81 mg by mouth daily      fluticasone-salmeterol (ADVAIR) 250-50 MCG/DOSE AEPB Inhale 1 puff into the lungs every 12 hours      sertraline (ZOLOFT) 50 MG tablet Take 50 mg by mouth daily       No current facility-administered medications for this visit.       Allergies   Allergen Reactions    Lisinopril Anaphylaxis     Health Maintenance   Topic Date Due    Hepatitis C screen  1955    Diabetic foot exam  05/11/1963    Diabetic retinal exam  05/11/1963    COVID-19 Vaccine (1 of 2) 05/11/1969    DTaP/Tdap/Td vaccine (1 - Tdap) 05/11/1972    Breast cancer screen  05/11/2003    Shingles Vaccine (1 of 2) 05/11/2003    Colon cancer screen colonoscopy  05/11/2003    DEXA (modify frequency per FRAX score)  05/11/2008    Pneumococcal 65+ years Vaccine (1 of 1 - PPSV23) 05/11/2018    Diabetic microalbuminuria test  09/29/2018    Lipid screen  09/29/2018    Annual Wellness Visit (AWV)  06/18/2019    A1C test (Diabetic or Prediabetic)  06/11/2020  TSH testing  06/11/2020    Potassium monitoring  07/01/2020    Creatinine monitoring  07/01/2020    Flu vaccine (1) 09/01/2020    Hepatitis A vaccine  Aged Out    Hib vaccine  Aged Out    Meningococcal (ACWY) vaccine  Aged Out       Subjective:  Review of Systems  General:   No fever, no chills, No fatigue or weight loss  Pulmonary:    No dyspnea, no wheezing  Cardiac:    Denies recent chest pain,   GI:     No nausea or vomiting, no abdominal pain  Neuro:    No dizziness or light headedness,   Musculoskeletal:  No recent active issues  Extremities:   No edema, no obvious claudication       Objective:  Physical Exam  BP (!) 155/62   Pulse 69   Ht 5' 4\" (1.626 m)   Wt (!) 375 lb (170.1 kg)   BMI 64.37 kg/m²   General:   Well developed, well nourished  Lungs:   Clear to auscultation  Heart:    Normal S1 S2, Slight murmur. no rubs, no gallops  Abdomen:   Soft, non tender, no organomegalies, positive bowel sounds  Extremities:   No edema, no cyanosis, good peripheral pulses  Neurological:   Awake, alert, oriented. No obvious focal deficits  Musculoskelatal:  No obvious deformities    Assessment:      Diagnosis Orders   1. Shortness of breath     2. Bradycardia     as above  Cardiac fair for now     Plan:  No follow-ups on file. As above  Continue risk factor modification and medical management,  Thank you for allowing me to participate in the care of your patient. Please don't hesitate to contact me regarding any further issues related to the patient care      Orders Placed:  No orders of the defined types were placed in this encounter. Medications Prescribed:  No orders of the defined types were placed in this encounter. Discussed use, benefit, and side effects of prescribed medications. All patient questions answered. Pt voicedunderstanding. Instructed to continue current medications, diet and exercise. Continue risk factor modification and medical management. Patient agreed with treatment plan. Follow up as directed.     Electronically signedby Michelle Bowie MD on 2/23/2021 at 2:11 PM

## 2022-02-15 ENCOUNTER — OFFICE VISIT (OUTPATIENT)
Dept: CARDIOLOGY CLINIC | Age: 67
End: 2022-02-15
Payer: MEDICARE

## 2022-02-15 VITALS
HEART RATE: 64 BPM | BODY MASS INDEX: 50.02 KG/M2 | SYSTOLIC BLOOD PRESSURE: 132 MMHG | HEIGHT: 64 IN | WEIGHT: 293 LBS | DIASTOLIC BLOOD PRESSURE: 64 MMHG

## 2022-02-15 DIAGNOSIS — E78.01 FAMILIAL HYPERCHOLESTEROLEMIA: ICD-10-CM

## 2022-02-15 DIAGNOSIS — R06.02 SOB (SHORTNESS OF BREATH): Primary | ICD-10-CM

## 2022-02-15 DIAGNOSIS — I10 PRIMARY HYPERTENSION: ICD-10-CM

## 2022-02-15 PROCEDURE — 4040F PNEUMOC VAC/ADMIN/RCVD: CPT | Performed by: NUCLEAR MEDICINE

## 2022-02-15 PROCEDURE — 1123F ACP DISCUSS/DSCN MKR DOCD: CPT | Performed by: NUCLEAR MEDICINE

## 2022-02-15 PROCEDURE — G8400 PT W/DXA NO RESULTS DOC: HCPCS | Performed by: NUCLEAR MEDICINE

## 2022-02-15 PROCEDURE — 1036F TOBACCO NON-USER: CPT | Performed by: NUCLEAR MEDICINE

## 2022-02-15 PROCEDURE — G8417 CALC BMI ABV UP PARAM F/U: HCPCS | Performed by: NUCLEAR MEDICINE

## 2022-02-15 PROCEDURE — 1090F PRES/ABSN URINE INCON ASSESS: CPT | Performed by: NUCLEAR MEDICINE

## 2022-02-15 PROCEDURE — 93000 ELECTROCARDIOGRAM COMPLETE: CPT | Performed by: NUCLEAR MEDICINE

## 2022-02-15 PROCEDURE — 99213 OFFICE O/P EST LOW 20 MIN: CPT | Performed by: NUCLEAR MEDICINE

## 2022-02-15 PROCEDURE — 3017F COLORECTAL CA SCREEN DOC REV: CPT | Performed by: NUCLEAR MEDICINE

## 2022-02-15 PROCEDURE — G8427 DOCREV CUR MEDS BY ELIG CLIN: HCPCS | Performed by: NUCLEAR MEDICINE

## 2022-02-15 PROCEDURE — G8484 FLU IMMUNIZE NO ADMIN: HCPCS | Performed by: NUCLEAR MEDICINE

## 2022-02-15 NOTE — PROGRESS NOTES
[address] [place] [address] [address] [facility],[address] [place] [address]  Dept: [phone]  Dept Fax: [phone]  Loc: [phone]    Visit Date: 2/15/2022    Josefina Lomas is a 74 y.o. female who presents todayfor:  Chief Complaint   Patient presents with    1 Year Follow Up    Cardiac Clearance     hysterscopy D and C    Bradycardia    Hypertension    Diabetes   going for d and C  Some bleeding   Here for clearance  No known CAD  Know risk for CAD  DM for few years  Limited by the weight  No chest pain   No changes in breathing  Bp is stablle  No dizziness  No syncope        HPI:  HPI  Past Medical History:   Diagnosis Date    Asthma     Blood circulation, collateral     COPD (chronic obstructive pulmonary disease) ([address]     COPD (chronic [facility] disease) ([facility] [facility]     Fibromyalgia     Hyperlipidemia     Hypertension     Hypothyroidism     Pickwickian syndrome ([facility] [address]     Psychiatric problem     Anxiety    Restless leg     Sleep apnea     Type 2 diabetes mellitus without complication ([facility] [address]       Past Surgical History:   Procedure Laterality Date    APPENDECTOMY      CATARACT REMOVAL      CHOLECYSTECTOMY       Family History   Problem Relation Age of Onset    Diabetes Mother     Heart Disease Mother     Diabetes Father     Diabetes Brother      Social History     Tobacco Use    Smoking status: Never Smoker    Smokeless tobacco: Never Used   Substance Use Topics    Alcohol use: No      Current Outpatient Medications   Medication Sig Dispense Refill    cefdinir (OMNICEF) 300 MG capsule Take 300 mg by mouth 2 times daily x1 week      clonazePAM (KLONOPIN) 1 MG tablet Take 1 mg by mouth nightly as needed.       magnesium oxide (MAG-OX) 400 MG tablet Take 400 mg by mouth daily Take 800mg at HS      Dulaglutide (TRULICITY) 6.53 OQ/2.2YK SOPN Inject 0.75 mg into the skin once a week      acetaminophen (TYLENOL) 325 MG tablet Take 650 mg by mouth every 8 hours as needed for Pain      acyclovir (ZOVIRAX) 5 % ointment Apply topically every 3 hours Apply topically every 3 hours.  cloNIDine (CATAPRES) 0.1 MG tablet Take 0.1 mg by mouth 2 times daily Take 0.5 mg by mouth 2x daily      hydrALAZINE (APRESOLINE) 100 MG tablet Take 1 tablet by mouth every 8 hours (Patient taking differently: Take 25 mg by mouth every 8 hours 1 tab 2x daily) 90 tablet 3    sodium bicarbonate 650 MG tablet Take 1 tablet by mouth 2 times daily 60 tablet 0    glipiZIDE (GLUCOTROL) 5 MG tablet Take 5 mg by mouth 2 times daily Only takes if sugar is >150       albuterol sulfate  (90 Base) MCG/ACT inhaler Inhale 2 puffs into the lungs every 6 hours as needed for Wheezing      amLODIPine (NORVASC) 10 MG tablet Take 1 tablet by mouth daily 30 tablet 3    levothyroxine (SYNTHROID) 50 MCG tablet Take 1 tablet by mouth Daily 30 tablet 5    pantoprazole (PROTONIX) 40 MG tablet Take 40 mg by mouth daily      aspirin 81 MG tablet Take 81 mg by mouth daily      fluticasone-salmeterol (ADVAIR) 250-50 MCG/DOSE AEPB Inhale 1 puff into the lungs every 12 hours      sertraline (ZOLOFT) 50 MG tablet Take 50 mg by mouth daily       No current facility-administered medications for this visit.      Allergies   Allergen Reactions    Lisinopril Anaphylaxis     Health Maintenance   Topic Date Due    Hepatitis C screen  Never done    COVID-19 Vaccine (1) Never done    Diabetic foot exam  Never done    Depression Monitoring  Never done    Diabetic retinal exam  Never done    DTaP/Tdap/Td vaccine (1 - Tdap) Never done    Colon cancer screen colonoscopy  Never done    Breast cancer screen  Never done    Shingles Vaccine (1 of 2) Never done    DEXA (modify frequency per FRAX score)  Never done    Pneumococcal 65+ years Vaccine (1 of 1 - PPSV23) Never done    Diabetic microalbuminuria test  09/29/2018    Lipid screen  09/29/2018    Annual Wellness Visit (AWV) Never done    TSH testing  06/11/2020    Potassium monitoring  07/01/2020    Creatinine monitoring  07/01/2020    Flu vaccine (1) Never done    A1C test (Diabetic or Prediabetic)  01/04/2023    Hepatitis A vaccine  Aged Out    Hib vaccine  Aged Out    Meningococcal (ACWY) vaccine  Aged Out       Subjective:  Review of Systems  General:   No fever, no chills, No fatigue or weight loss  Pulmonary:    No dyspnea, no wheezing  Cardiac:    Denies recent chest pain,   GI:     No nausea or vomiting, no abdominal pain  Neuro:    No dizziness or light headedness,   Musculoskeletal:  No recent active issues  Extremities:   No edema, no obvious claudication       Objective:  Physical Exam  /64   Pulse 64   Ht 5' 4\" (1.626 m)   Wt (!) 345 lb (156.5 kg)   BMI 59.22 kg/m²   General:   Well developed, well nourished  Lungs:   Clear to auscultation  Heart:    Normal S1 S2, Slight murmur. no rubs, no gallops  Abdomen:   Soft, non tender, no organomegalies, positive bowel sounds  Extremities:   No edema, no cyanosis, good peripheral pulses  Neurological:   Awake, alert, oriented. No obvious focal deficits  Musculoskelatal:  No obvious deformities    Assessment:      Diagnosis Orders   1. SOB (shortness of breath)  EKG 12 Lead   2. Primary hypertension     3. Familial hypercholesterolemia     as above  Risk for CAD  Cardiac seems stable for now   Stress test 2020   ECG in office was done today. I reviewed the ECG. No acute findings    Plan:  No follow-ups on file. Moderate risk   Clear for surgery   No new symptoms  Continue risk factor modification and medical management  Thank you for allowing me to participate in the care of your patient. Please don't hesitate to contact me regarding any further issues related to the patient care    Orders Placed:  Orders Placed This Encounter   Procedures    EKG 12 Lead     Order Specific Question:   Reason for Exam?     Answer:    Other       Medications Prescribed:  No orders of the defined types were placed in this encounter. Discussed use, benefit, and side effects of prescribed medications. All patient questions answered. Pt voicedunderstanding. Instructed to continue current medications, diet and exercise. Continue risk factor modification and medical management. Patient agreed with treatment plan. Follow up as directed.     Electronically signedby Flor Rubio MD on 2/15/2022 at 2:17 PM

## 2022-02-15 NOTE — PROGRESS NOTES
Pt C/O sob on exertion, swelling in legs due to too much salt, some fatigue      Pt denies CP, Headache, dizziness, heart palpitations

## 2023-02-20 ENCOUNTER — TELEPHONE (OUTPATIENT)
Dept: CARDIOLOGY CLINIC | Age: 68
End: 2023-02-20

## 2023-02-20 NOTE — TELEPHONE ENCOUNTER
Robin Jackson called to RS her appt with Brandee Hui for chi 02-21, she didn't realize she even had an appt. I canceled her appt, just needs to get a new one scheduled. She wants her appt in [address]. Please call her back to get a new appt scheduled.

## 2023-05-02 ENCOUNTER — OFFICE VISIT (OUTPATIENT)
Dept: CARDIOLOGY CLINIC | Age: 68
End: 2023-05-02
Payer: MEDICARE

## 2023-05-02 DIAGNOSIS — I10 PRIMARY HYPERTENSION: Primary | ICD-10-CM

## 2023-05-02 DIAGNOSIS — E78.01 FAMILIAL HYPERCHOLESTEROLEMIA: ICD-10-CM

## 2023-05-02 PROCEDURE — G8421 BMI NOT CALCULATED: HCPCS | Performed by: NUCLEAR MEDICINE

## 2023-05-02 PROCEDURE — 1090F PRES/ABSN URINE INCON ASSESS: CPT | Performed by: NUCLEAR MEDICINE

## 2023-05-02 PROCEDURE — 1036F TOBACCO NON-USER: CPT | Performed by: NUCLEAR MEDICINE

## 2023-05-02 PROCEDURE — G8400 PT W/DXA NO RESULTS DOC: HCPCS | Performed by: NUCLEAR MEDICINE

## 2023-05-02 PROCEDURE — G8427 DOCREV CUR MEDS BY ELIG CLIN: HCPCS | Performed by: NUCLEAR MEDICINE

## 2023-05-02 PROCEDURE — 3017F COLORECTAL CA SCREEN DOC REV: CPT | Performed by: NUCLEAR MEDICINE

## 2023-05-02 PROCEDURE — 1123F ACP DISCUSS/DSCN MKR DOCD: CPT | Performed by: NUCLEAR MEDICINE

## 2023-05-02 PROCEDURE — 99213 OFFICE O/P EST LOW 20 MIN: CPT | Performed by: NUCLEAR MEDICINE

## 2023-05-02 NOTE — PROGRESS NOTES
[address] [place] [address] [address] [facility],[address] [address] [facility]  Dept: [phone]  Dept Fax: [phone]  Loc: [phone]    Visit Date: 5/2/2023    Adalberto Crenshaw is a 69 y.o. female who presents todayfor:  Chief Complaint   Patient presents with    Follow-up    Hypertension     Severe obesity and limitation   Risk for CAD  Seen initially for pre op for D and C  No chest pain  Some baseline dyspnea  Does have DM   Not the best controlled  BP is stable  Not followed    HPI:  HPI  Past Medical History:   Diagnosis Date    Asthma     Blood circulation, collateral     COPD (chronic obstructive pulmonary disease) (HCC)     COPD (chronic obstructive pulmonary disease) ([facility] [facility]     Fibromyalgia     Hyperlipidemia     Hypertension     Hypothyroidism     Pickwickian syndrome ([facility] [address]     Psychiatric problem     Anxiety    Restless leg     Sleep apnea     Type 2 diabetes mellitus without complication ([facility] [address]       Past Surgical History:   Procedure Laterality Date    APPENDECTOMY      CATARACT REMOVAL      CHOLECYSTECTOMY       Family History   Problem Relation Age of Onset    Diabetes Mother     Heart Disease Mother     Diabetes Father     Diabetes Brother      Social History     Tobacco Use    Smoking status: Never    Smokeless tobacco: Never   Substance Use Topics    Alcohol use: No      Current Outpatient Medications   Medication Sig Dispense Refill    clonazePAM (KLONOPIN) 1 MG tablet Take 1 tablet by mouth nightly as needed.       magnesium oxide (MAG-OX) 400 MG tablet Take 1 tablet by mouth daily Take 800mg at HS      Dulaglutide (TRULICITY) 9.51 QU/4.4MR SOPN Inject 0.75 mg into the skin once a week      acetaminophen (TYLENOL) 325 MG tablet Take 2 tablets by mouth every 8 hours as needed for Pain      cloNIDine (CATAPRES) 0.1 MG tablet Take 1 tablet by mouth 2 times daily Take 0.5 mg by mouth 2x daily      hydrALAZINE (APRESOLINE) 100 MG tablet Take 1 tablet by